# Patient Record
Sex: MALE | Race: WHITE | NOT HISPANIC OR LATINO | Employment: FULL TIME | URBAN - METROPOLITAN AREA
[De-identification: names, ages, dates, MRNs, and addresses within clinical notes are randomized per-mention and may not be internally consistent; named-entity substitution may affect disease eponyms.]

---

## 2017-06-16 ENCOUNTER — OFFICE VISIT (OUTPATIENT)
Dept: FAMILY MEDICINE CLINIC | Facility: CLINIC | Age: 37
End: 2017-06-16

## 2017-06-16 VITALS
DIASTOLIC BLOOD PRESSURE: 72 MMHG | WEIGHT: 204 LBS | RESPIRATION RATE: 16 BRPM | HEIGHT: 69 IN | HEART RATE: 68 BPM | TEMPERATURE: 97.2 F | SYSTOLIC BLOOD PRESSURE: 108 MMHG | BODY MASS INDEX: 30.21 KG/M2

## 2017-06-16 DIAGNOSIS — J01.00 ACUTE NON-RECURRENT MAXILLARY SINUSITIS: Primary | ICD-10-CM

## 2017-06-16 DIAGNOSIS — J30.2 SEASONAL ALLERGIC RHINITIS, UNSPECIFIED ALLERGIC RHINITIS TRIGGER: ICD-10-CM

## 2017-06-16 PROCEDURE — 99203 OFFICE O/P NEW LOW 30 MIN: CPT | Performed by: NURSE PRACTITIONER

## 2017-06-16 RX ORDER — FEXOFENADINE HCL AND PSEUDOEPHEDRINE HCI 180; 240 MG/1; MG/1
1 TABLET, EXTENDED RELEASE ORAL DAILY
COMMUNITY
End: 2017-06-16 | Stop reason: SDUPTHER

## 2017-06-16 RX ORDER — AMOXICILLIN AND CLAVULANATE POTASSIUM 875; 125 MG/1; MG/1
1 TABLET, FILM COATED ORAL 2 TIMES DAILY
Qty: 20 TABLET | Refills: 0 | Status: SHIPPED | OUTPATIENT
Start: 2017-06-16 | End: 2017-06-26

## 2017-06-16 RX ORDER — FLUTICASONE PROPIONATE 50 MCG
2 SPRAY, SUSPENSION (ML) NASAL DAILY
Qty: 1 EACH | Refills: 5 | Status: SHIPPED | OUTPATIENT
Start: 2017-06-16 | End: 2017-07-16

## 2017-06-16 RX ORDER — FEXOFENADINE HCL AND PSEUDOEPHEDRINE HCI 180; 240 MG/1; MG/1
1 TABLET, EXTENDED RELEASE ORAL DAILY
Qty: 30 TABLET | Refills: 5 | Status: SHIPPED | OUTPATIENT
Start: 2017-06-16 | End: 2017-06-26

## 2017-06-16 NOTE — PROGRESS NOTES
Subjective   Santana Radford is a 37 y.o. male.     History of Present Illness   NP to establish care  Sinus infection   Nasal congestion, head fullness, ear fullness, cough and ST for the past week or more, started getting better then now feeling much worse, fever and yellow thick nasal secretions  Facial pain and pressure  No wheezing or tightness in chest or productive cough  He has a hx of sinus infections one bad one usually one time a year since he started taking allergy medication Allegra D year round has decreased number of sinus infections  Other family members are sick too  No other complaints or issues today    The following portions of the patient's history were reviewed and updated as appropriate: allergies, current medications, past family history, past medical history, past social history, past surgical history and problem list.    Review of Systems   Constitutional: Positive for fever.   HENT: Positive for congestion, ear pain, postnasal drip, rhinorrhea, sinus pressure and sore throat. Negative for trouble swallowing.    Eyes: Negative.    Respiratory: Positive for cough. Negative for chest tightness, shortness of breath and wheezing.    Cardiovascular: Negative.    Gastrointestinal: Negative.    Endocrine: Negative.    Genitourinary: Negative.    Musculoskeletal: Negative.    Skin: Negative.    Allergic/Immunologic: Negative.    Neurological: Positive for headaches. Negative for dizziness and light-headedness.   Hematological: Negative.    Psychiatric/Behavioral: Negative.        Objective   Physical Exam   Constitutional: He is oriented to person, place, and time. Vital signs are normal. He appears well-developed and well-nourished. No distress.   HENT:   Head: Normocephalic.   Right Ear: External ear and ear canal normal. Tympanic membrane is retracted (clear fluid behind TM). Tympanic membrane is not erythematous.   Left Ear: External ear and ear canal normal. Tympanic membrane is bulging.  Tympanic membrane is not erythematous.   Nose: Mucosal edema and rhinorrhea present. Right sinus exhibits maxillary sinus tenderness. Right sinus exhibits no frontal sinus tenderness. Left sinus exhibits maxillary sinus tenderness. Left sinus exhibits no frontal sinus tenderness.   Mouth/Throat: Uvula is midline, oropharynx is clear and moist and mucous membranes are normal.   Eyes: Conjunctivae and lids are normal. Pupils are equal, round, and reactive to light.   Neck: Trachea normal. Neck supple. Carotid bruit is not present. No thyromegaly present.   Cardiovascular: Normal rate, regular rhythm, S1 normal, S2 normal, normal heart sounds and intact distal pulses.    Pulmonary/Chest: Effort normal and breath sounds normal.   Abdominal: Soft. Normal appearance and bowel sounds are normal.   Musculoskeletal: He exhibits no edema.   Lymphadenopathy:        Head (right side): No tonsillar, no preauricular, no posterior auricular and no occipital adenopathy present.        Head (left side): No tonsillar, no preauricular, no posterior auricular and no occipital adenopathy present.     He has no cervical adenopathy.   Neurological: He is alert and oriented to person, place, and time.   Skin: Skin is warm, dry and intact. No rash noted. No cyanosis. Nails show no clubbing.   Psychiatric: He has a normal mood and affect. His speech is normal and behavior is normal. Judgment and thought content normal. Cognition and memory are normal.   Nursing note and vitals reviewed.      Assessment/Plan ;  Santana was seen today for np / establish care and sinus congestion & drainage.    Diagnoses and all orders for this visit:    Acute non-recurrent maxillary sinusitis    Other orders  -     amoxicillin-clavulanate (AUGMENTIN) 875-125 MG per tablet; Take 1 tablet by mouth 2 (Two) Times a Day.  -     fluticasone (FLONASE) 50 MCG/ACT nasal spray; 2 sprays into each nostril Daily for 30 days. Administer 2 sprays in each nostril for each  dose.  -     fexofenadine-pseudoephedrine (ALLEGRA-D 24) 180-240 MG per 24 hr tablet; Take 1 tablet by mouth Daily.      Take meds as directed  Recommended sinus rinse  Will refill Allegra D  F/U as needed

## 2017-06-20 ENCOUNTER — TELEPHONE (OUTPATIENT)
Dept: FAMILY MEDICINE CLINIC | Facility: CLINIC | Age: 37
End: 2017-06-20

## 2017-06-20 RX ORDER — METHYLPREDNISOLONE 4 MG/1
TABLET ORAL
Qty: 21 TABLET | Refills: 0 | Status: SHIPPED | OUTPATIENT
Start: 2017-06-20 | End: 2017-06-26

## 2017-06-20 NOTE — TELEPHONE ENCOUNTER
----- Message from Cristina Marie sent at 6/20/2017  8:22 AM EDT -----  Contact: MANAS  PATIENT DOES NOT FEEL ANY BETTER, HE SAID HE WAS TOLD HE WOULD FEEL SIGNIFICANTLY BETTER BY Monday. AND HE DOES NOT FEEL ANY BETTER.     [phone]    [facility]

## 2017-06-26 ENCOUNTER — OFFICE VISIT (OUTPATIENT)
Dept: FAMILY MEDICINE CLINIC | Facility: CLINIC | Age: 37
End: 2017-06-26

## 2017-06-26 VITALS
BODY MASS INDEX: 29.65 KG/M2 | TEMPERATURE: 98.1 F | RESPIRATION RATE: 16 BRPM | SYSTOLIC BLOOD PRESSURE: 120 MMHG | WEIGHT: 200.2 LBS | DIASTOLIC BLOOD PRESSURE: 90 MMHG | HEIGHT: 69 IN | HEART RATE: 68 BPM

## 2017-06-26 DIAGNOSIS — R10.31 RIGHT LOWER QUADRANT PAIN: ICD-10-CM

## 2017-06-26 DIAGNOSIS — J01.10 ACUTE NON-RECURRENT FRONTAL SINUSITIS: Primary | ICD-10-CM

## 2017-06-26 PROCEDURE — 99214 OFFICE O/P EST MOD 30 MIN: CPT | Performed by: FAMILY MEDICINE

## 2017-06-26 RX ORDER — LEVOFLOXACIN 500 MG/1
500 TABLET, FILM COATED ORAL DAILY
Qty: 7 TABLET | Refills: 0 | Status: SHIPPED | OUTPATIENT
Start: 2017-06-26 | End: 2017-07-03

## 2017-06-26 NOTE — PATIENT INSTRUCTIONS
Go to the nearest ER or return to clinic if symptoms worsen, fever/chill develop      Sinusitis, Adult  Sinusitis is soreness and inflammation of your sinuses. Sinuses are hollow spaces in the bones around your face. They are located:  · Around your eyes.  · In the middle of your forehead.  · Behind your nose.  · In your cheekbones.  Your sinuses and nasal passages are lined with a stringy fluid (mucus). Mucus normally drains out of your sinuses. When your nasal tissues get inflamed or swollen, the mucus can get trapped or blocked so air cannot flow through your sinuses. This lets bacteria, viruses, and funguses grow, and that leads to infection.  HOME CARE  Medicines  · Take, use, or apply over-the-counter and prescription medicines only as told by your doctor. These may include nasal sprays.  · If you were prescribed an antibiotic medicine, take it as told by your doctor. Do not stop taking the antibiotic even if you start to feel better.  Hydrate and Humidify  · Drink enough water to keep your pee (urine) clear or pale yellow.  · Use a cool mist humidifier to keep the humidity level in your home above 50%.  · Breathe in steam for 10-15 minutes, 3-4 times a day or as told by your doctor. You can do this in the bathroom while a hot shower is running.  · Try not to spend time in cool or dry air.  Rest  · Rest as much as possible.    · Sleep with your head raised (elevated).  · Make sure to get enough sleep each night.  General Instructions  · Put a warm, moist washcloth on your face 3-4 times a day or as told by your doctor. This will help with discomfort.  · Wash your hands often with soap and water. If there is no soap and water, use hand .  · Do not smoke. Avoid being around people who are smoking (secondhand smoke).  · Keep all follow-up visits as told by your doctor. This is important.  GET HELP IF:  · You have a fever.  · Your symptoms get worse.  · Your symptoms do not get better within 10 days.  GET  HELP RIGHT AWAY IF:  · You have a very bad headache.  · You cannot stop throwing up (vomiting).  · You have pain or swelling around your face or eyes.  · You have trouble seeing.  · You feel confused.  · Your neck is stiff.  · You have trouble breathing.     This information is not intended to replace advice given to you by your health care provider. Make sure you discuss any questions you have with your health care provider.     Document Released: 06/05/2009 Document Revised: 04/10/2017 Document Reviewed: 10/12/2016  [employer] Interactive Patient Education ©2017 [employer]

## 2017-06-26 NOTE — PROGRESS NOTES
Subjective   Santana Radford is a 37 y.o. male.     Sinusitis   This is a new problem. The current episode started 1 to 4 weeks ago. The problem is unchanged. The maximum temperature recorded prior to his arrival was 100.4 - 100.9 F. The fever has been present for less than 1 day. Associated symptoms include congestion, coughing, headaches and sinus pressure. Pertinent negatives include no shortness of breath or sore throat. Treatments tried: Augmentin, Medrol, Allegra-D, Flonase. The treatment provided mild relief.      RiLQ pain x 1 year  Symptoms are sporadic  Made worse by driving for long periods or lifting/pulling. States that it is uncomfortable at the moment because he was in a car all weekend.   Also, pulled weeds out of yard and that made symptoms worse.   Denies any bulges in abdomen  Eating doesn't trigger pain  BM are normal, no blood present     The following portions of the patient's history were reviewed and updated as appropriate: allergies, current medications, past family history, past medical history, past social history, past surgical history and problem list.    Review of Systems   Constitutional: Positive for fever.   HENT: Positive for congestion, postnasal drip, rhinorrhea and sinus pressure. Negative for sore throat.    Respiratory: Positive for cough. Negative for shortness of breath.    Cardiovascular: Negative for chest pain and palpitations.   Gastrointestinal: Positive for abdominal pain. Negative for abdominal distention, constipation, diarrhea, nausea and vomiting.   Genitourinary: Negative for dysuria and flank pain.   Musculoskeletal: Negative for back pain.   Neurological: Positive for headaches. Negative for dizziness.       Objective   Physical Exam   Constitutional: He is oriented to person, place, and time. He appears well-developed and well-nourished.   HENT:   Head: Normocephalic and atraumatic.   Right Ear: Hearing, external ear and ear canal normal. A middle ear effusion  (clear with air bubble) is present.   Left Ear: Hearing, tympanic membrane, external ear and ear canal normal.   Nose: Right sinus exhibits frontal sinus tenderness. Left sinus exhibits frontal sinus tenderness.   Mouth/Throat: Uvula is midline and mucous membranes are normal. Oropharyngeal exudate present.   Eyes: Conjunctivae and EOM are normal.   Neck: Normal range of motion. Neck supple.   Cardiovascular: Normal rate, regular rhythm and normal heart sounds.    Pulmonary/Chest: Effort normal and breath sounds normal.   Abdominal: Soft. Bowel sounds are normal. He exhibits no distension. There is tenderness in the right upper quadrant and right lower quadrant. There is no rebound and no guarding.   Musculoskeletal: He exhibits no edema.   Lymphadenopathy:     He has no cervical adenopathy.   Neurological: He is alert and oriented to person, place, and time. No cranial nerve deficit.   Skin: Skin is warm and dry.   Psychiatric: He has a normal mood and affect. His behavior is normal.   Nursing note and vitals reviewed.      Assessment/Plan   Santana was seen today for sinus congestion & drainage.    Diagnoses and all orders for this visit:    Acute non-recurrent frontal sinusitis  -     Chlorcyclizine-Pseudoephed 25-60 MG tablet; Take 1 tab po every 8 hours as needed for congestion  -     levoFLOXacin (LEVAQUIN) 500 MG tablet; Take 1 tablet by mouth Daily for 7 days.    Right lower quadrant pain  -     CT Abdomen Pelvis With Contrast    Sinusitis treated with Stahist and levaquin. Advised him to use saline nasal rinse to improve sinus congestion  CT of abdomen/pelvis ordered to evaluate chronic abdominal pain.

## 2017-06-28 ENCOUNTER — HOSPITAL ENCOUNTER (OUTPATIENT)
Dept: CT IMAGING | Facility: HOSPITAL | Age: 37
Discharge: HOME | End: 2017-06-28
Attending: FAMILY MEDICINE | Admitting: FAMILY MEDICINE

## 2017-06-28 PROCEDURE — 74177 CT ABD & PELVIS W/CONTRAST: CPT

## 2017-06-28 PROCEDURE — 0 IOPAMIDOL 61 % SOLUTION: Performed by: FAMILY MEDICINE

## 2017-06-28 RX ADMIN — IOPAMIDOL 70 ML: 612 INJECTION, SOLUTION INTRAVENOUS at 11:00

## 2017-08-24 ENCOUNTER — OFFICE VISIT (OUTPATIENT)
Dept: FAMILY MEDICINE CLINIC | Facility: CLINIC | Age: 37
End: 2017-08-24

## 2017-08-24 VITALS
DIASTOLIC BLOOD PRESSURE: 82 MMHG | SYSTOLIC BLOOD PRESSURE: 122 MMHG | HEART RATE: 64 BPM | TEMPERATURE: 97.1 F | WEIGHT: 200.6 LBS | BODY MASS INDEX: 29.71 KG/M2 | RESPIRATION RATE: 20 BRPM | HEIGHT: 69 IN

## 2017-08-24 DIAGNOSIS — Z00.00 PERIODIC HEALTH ASSESSMENT, GENERAL SCREENING, ADULT: Primary | ICD-10-CM

## 2017-08-24 PROCEDURE — 99395 PREV VISIT EST AGE 18-39: CPT | Performed by: NURSE PRACTITIONER

## 2017-08-24 RX ORDER — MULTIVIT WITH MINERALS/LUTEIN
1000 TABLET ORAL DAILY
COMMUNITY

## 2017-08-24 NOTE — PROGRESS NOTES
"Subjective   Santana Radford is a 37 y.o. male and is here for a comprehensive physical exam. The patient reports no problems.    Do you take any herbs or supplements that were not prescribed by a doctor? yes Vit C, MVI  Are you taking calcium supplements? no  Are you taking aspirin daily? no     History:  Patient receives prostate care outside our clinic  Date last prostate exam: NA  Date last PSA: NA    No recent eye exam, no visual disturbances, no family hx of glaucoma  Up to date on dental exams  Eats healthy diet.  Regular exercise just yard work   with 3 children. Doing well with family  Work going well.   Sleeping well  Regular BMs  Voiding well with frequency or urgency    The following portions of the patient's history were reviewed and updated as appropriate: allergies, current medications, past family history, past medical history, past social history, past surgical history and problem list.    Review of Systems  Do you have pain that bothers you in your daily life? no  A comprehensive review of systems was negative. in all 12 ROS negative    Objective   /82  Pulse 64  Temp 97.1 °F (36.2 °C)  Resp 20  Ht 69\" (175.3 cm)  Wt 200 lb 9.6 oz (91 kg)  BMI 29.62 kg/m2  General appearance: alert, appears stated age and cooperative  Head: Normocephalic, without obvious abnormality, atraumatic  Eyes: conjunctivae/corneas clear. PERRL, EOM's intact. Fundi benign.  Ears: normal TM's and external ear canals both ears  Nose: Nares normal. Septum midline. Mucosa normal. No drainage or sinus tenderness.  Throat: lips, mucosa, and tongue normal; teeth and gums normal  Neck: no adenopathy, no carotid bruit, no JVD, supple, symmetrical, trachea midline and thyroid not enlarged, symmetric, no tenderness/mass/nodules  Lungs: clear to auscultation bilaterally  Heart: regular rate and rhythm, S1, S2 normal, no murmur, click, rub or gallop  Abdomen: soft, non-tender; bowel sounds normal; no masses,  no " organomegaly  Extremities: extremities normal, atraumatic, no cyanosis or edema  Pulses: 2+ and symmetric  Skin: Skin color, texture, turgor normal. No rashes or lesions  Lymph nodes: Cervical, supraclavicular, and axillary nodes normal.  Neurologic: Grossly normal     Assessment/Plan   Healthy male exam.     1. Screening labs lipids, CMP today. Will complete wellness forms for pt pending results and will fax to appropriate place.  2. Patient Counseling:  --Nutrition: Stressed importance of moderation in sodium/caffeine intake, saturated fat and cholesterol, caloric balance, sufficient intake of fresh fruits, vegetables, fiber, calcium, iron, and 1 mg of folate supplement per day (for females capable of pregnancy).  --Discussed the importance of regular exercise, stress reduction, regular sleep at least 8 hours per night, healthy eating, regular dental and eye exams. Will start prostate screening at 39, colonoscopy age 49. No texting and driving.   --Exercise: Stressed the importance of regular exercise.   --Substance Abuse: Discussed cessation/primary prevention of tobacco, alcohol, or other drug use; driving or other dangerous activities under the influence; availability of treatment for abuse.   --Injury prevention: Discussed safety belts, safety helmets, smoke detector, smoking near bedding or upholstery.   --Dental health: Discussed importance of regular tooth brushing, flossing, and dental visits.  --Immunizations reviewed. Pt is up to date  --Discussed benefits of screening colonoscopy.  --After hours service discussed with patient    3. Discussed the patient's BMI with him.  The BMI is in the acceptable range  4. Follow up in one year

## 2017-08-25 DIAGNOSIS — R79.89 ELEVATED LFTS: Primary | ICD-10-CM

## 2017-08-25 LAB
ALBUMIN SERPL-MCNC: 4.4 G/DL (ref 3.2–4.8)
ALBUMIN/GLOB SERPL: 1.9 G/DL (ref 1.5–2.5)
ALP SERPL-CCNC: 96 U/L (ref 25–100)
ALT SERPL-CCNC: 68 U/L (ref 7–40)
AST SERPL-CCNC: 37 U/L (ref 0–33)
BILIRUB SERPL-MCNC: 1 MG/DL (ref 0.3–1.2)
BUN SERPL-MCNC: 14 MG/DL (ref 9–23)
BUN/CREAT SERPL: 15.6 (ref 7–25)
CALCIUM SERPL-MCNC: 9 MG/DL (ref 8.7–10.4)
CHLORIDE SERPL-SCNC: 105 MMOL/L (ref 99–109)
CHOLEST SERPL-MCNC: 209 MG/DL (ref 0–200)
CO2 SERPL-SCNC: 28 MMOL/L (ref 20–31)
CREAT SERPL-MCNC: 0.9 MG/DL (ref 0.6–1.3)
GLOBULIN SER CALC-MCNC: 2.3 GM/DL
GLUCOSE SERPL-MCNC: 92 MG/DL (ref 70–100)
HDLC SERPL-MCNC: 51 MG/DL (ref 40–60)
LDLC SERPL CALC-MCNC: 138 MG/DL (ref 0–100)
POTASSIUM SERPL-SCNC: 4.4 MMOL/L (ref 3.5–5.5)
PROT SERPL-MCNC: 6.7 G/DL (ref 5.7–8.2)
SODIUM SERPL-SCNC: 138 MMOL/L (ref 132–146)
TRIGL SERPL-MCNC: 98 MG/DL (ref 0–150)
VLDLC SERPL CALC-MCNC: 19.6 MG/DL

## 2017-08-30 ENCOUNTER — RESULTS ENCOUNTER (OUTPATIENT)
Dept: FAMILY MEDICINE CLINIC | Facility: CLINIC | Age: 37
End: 2017-08-30

## 2017-08-30 DIAGNOSIS — R79.89 ELEVATED LFTS: ICD-10-CM

## 2018-05-03 ENCOUNTER — TELEPHONE (OUTPATIENT)
Dept: FAMILY MEDICINE CLINIC | Facility: CLINIC | Age: 38
End: 2018-05-03

## 2018-05-03 DIAGNOSIS — J01.10 ACUTE NON-RECURRENT FRONTAL SINUSITIS: ICD-10-CM

## 2018-05-03 NOTE — TELEPHONE ENCOUNTER
----- Message from Sarah Hart sent at 5/3/2018  8:29 AM EDT -----  Contact: DR [facility]ER MED REFILL  MED REFILL ON Chlorcyclizine-Pseudoephed 25-60 MG tablet TO [facility] IN [place]  [phone]

## 2018-05-07 ENCOUNTER — TELEPHONE (OUTPATIENT)
Dept: FAMILY MEDICINE CLINIC | Facility: CLINIC | Age: 38
End: 2018-05-07

## 2018-05-07 NOTE — TELEPHONE ENCOUNTER
----- Message from Cristina Marie sent at 5/7/2018  9:30 AM EDT -----  PATIENT HAD CALLED FOR A REFILL FOR ALLEGRO D AND STATIS WAS CALLED IN .     HE WOULD LIKE TO GET THE ALLEGRA BECAUSE ITS 24 HR    ALLEGRA -D 24  180-240 MG PER 24 HOUR TABLET    [facility] IN [place]

## 2018-05-08 RX ORDER — FEXOFENADINE HCL AND PSEUDOEPHEDRINE HCI 180; 240 MG/1; MG/1
1 TABLET, EXTENDED RELEASE ORAL DAILY
Qty: 30 TABLET | Refills: 5 | Status: SHIPPED | OUTPATIENT
Start: 2018-05-08 | End: 2019-01-24 | Stop reason: SDUPTHER

## 2019-01-24 RX ORDER — FEXOFENADINE HYDROCHLORIDE AND PSEUDOEPHEDRINE HYDROCHLORIDE 180; 240 MG/1; MG/1
TABLET, FILM COATED, EXTENDED RELEASE ORAL
Qty: 30 TABLET | Refills: 1 | Status: SHIPPED | OUTPATIENT
Start: 2019-01-24 | End: 2019-03-08 | Stop reason: SDUPTHER

## 2019-03-08 ENCOUNTER — OFFICE VISIT (OUTPATIENT)
Dept: FAMILY MEDICINE CLINIC | Facility: CLINIC | Age: 39
End: 2019-03-08

## 2019-03-08 VITALS
SYSTOLIC BLOOD PRESSURE: 118 MMHG | WEIGHT: 201 LBS | HEART RATE: 75 BPM | DIASTOLIC BLOOD PRESSURE: 72 MMHG | RESPIRATION RATE: 18 BRPM | BODY MASS INDEX: 29.68 KG/M2 | OXYGEN SATURATION: 98 % | TEMPERATURE: 97.6 F

## 2019-03-08 DIAGNOSIS — J01.90 ACUTE NON-RECURRENT SINUSITIS, UNSPECIFIED LOCATION: Primary | ICD-10-CM

## 2019-03-08 PROCEDURE — 99213 OFFICE O/P EST LOW 20 MIN: CPT | Performed by: FAMILY MEDICINE

## 2019-03-08 RX ORDER — AMOXICILLIN AND CLAVULANATE POTASSIUM 875; 125 MG/1; MG/1
1 TABLET, FILM COATED ORAL 2 TIMES DAILY
Qty: 20 TABLET | Refills: 0 | Status: SHIPPED | OUTPATIENT
Start: 2019-03-08 | End: 2019-03-18

## 2019-03-08 RX ORDER — FEXOFENADINE HCL AND PSEUDOEPHEDRINE HCI 180; 240 MG/1; MG/1
1 TABLET, EXTENDED RELEASE ORAL DAILY
Qty: 30 TABLET | Refills: 1 | Status: SHIPPED | OUTPATIENT
Start: 2019-03-08 | End: 2019-05-15 | Stop reason: SDUPTHER

## 2019-03-08 RX ORDER — METHYLPREDNISOLONE 4 MG/1
TABLET ORAL
Qty: 21 EACH | Refills: 0 | Status: SHIPPED | OUTPATIENT
Start: 2019-03-08 | End: 2019-03-18

## 2019-03-08 NOTE — PATIENT INSTRUCTIONS
Go to the nearest ER or return to clinic if symptoms worsen, fever/chill develop    Sinusitis, Adult  Sinusitis is soreness and inflammation of your sinuses. Sinuses are hollow spaces in the bones around your face. They are located:  · Around your eyes.  · In the middle of your forehead.  · Behind your nose.  · In your cheekbones.    Your sinuses and nasal passages are lined with a stringy fluid (mucus). Mucus normally drains out of your sinuses. When your nasal tissues get inflamed or swollen, the mucus can get trapped or blocked so air cannot flow through your sinuses. This lets bacteria, viruses, and funguses grow, and that leads to infection.  Follow these instructions at home:  Medicines  · Take, use, or apply over-the-counter and prescription medicines only as told by your doctor. These may include nasal sprays.  · If you were prescribed an antibiotic medicine, take it as told by your doctor. Do not stop taking the antibiotic even if you start to feel better.  Hydrate and Humidify  · Drink enough water to keep your pee (urine) clear or pale yellow.  · Use a cool mist humidifier to keep the humidity level in your home above 50%.  · Breathe in steam for 10-15 minutes, 3-4 times a day or as told by your doctor. You can do this in the bathroom while a hot shower is running.  · Try not to spend time in cool or dry air.  Rest  · Rest as much as possible.  · Sleep with your head raised (elevated).  · Make sure to get enough sleep each night.  General instructions  · Put a warm, moist washcloth on your face 3-4 times a day or as told by your doctor. This will help with discomfort.  · Wash your hands often with soap and water. If there is no soap and water, use hand .  · Do not smoke. Avoid being around people who are smoking (secondhand smoke).  · Keep all follow-up visits as told by your doctor. This is important.  Contact a doctor if:  · You have a fever.  · Your symptoms get worse.  · Your symptoms do not  get better within 10 days.  Get help right away if:  · You have a very bad headache.  · You cannot stop throwing up (vomiting).  · You have pain or swelling around your face or eyes.  · You have trouble seeing.  · You feel confused.  · Your neck is stiff.  · You have trouble breathing.  This information is not intended to replace advice given to you by your health care provider. Make sure you discuss any questions you have with your health care provider.  Document Released: 06/05/2009 Document Revised: 08/13/2017 Document Reviewed: 10/12/2016  [employer] Interactive Patient Education © 2018 [employer]

## 2019-03-08 NOTE — PROGRESS NOTES
Subjective   Santana Radford is a 39 y.o. male.   Chief Complaint   Patient presents with   • Sinusitis     1Xweek    • Cough   • Nasal Congestion     Sinusitis   This is a new problem. The current episode started 1 to 4 weeks ago. The problem is unchanged. There has been no fever. Associated symptoms include congestion, coughing (non-productive), ear pain, headaches and sinus pressure. Pertinent negatives include no chills, shortness of breath or sore throat. Past treatments include oral decongestants (Allegra-D). The treatment provided no relief.        The following portions of the patient's history were reviewed and updated as appropriate: allergies, current medications, past family history, past medical history, past social history, past surgical history and problem list.    Review of Systems   Constitutional: Negative for chills and fever.   HENT: Positive for congestion, ear pain, postnasal drip and sinus pressure. Negative for sore throat.    Respiratory: Positive for cough (non-productive). Negative for shortness of breath and wheezing.    Cardiovascular: Negative.    Gastrointestinal: Negative for nausea and vomiting.       Objective   Physical Exam   Constitutional: He appears well-developed and well-nourished. No distress.   HENT:   Head: Normocephalic.   Right Ear: Hearing, external ear and ear canal normal. Tympanic membrane is not injected, not erythematous and not retracted. A middle ear effusion (clear fluid) is present.   Left Ear: Hearing, tympanic membrane, external ear and ear canal normal. Tympanic membrane is not injected, not erythematous and not retracted.   Nose: Congestion present.   Mouth/Throat: Uvula is midline and mucous membranes are normal. Oropharyngeal exudate present.   Eyes: Conjunctivae are normal.   Cardiovascular: Normal rate, regular rhythm and normal heart sounds.   Pulmonary/Chest: Effort normal and breath sounds normal. He has no wheezes.   Musculoskeletal: He exhibits no  edema.   Lymphadenopathy:     He has no cervical adenopathy.   Neurological: He is alert.   Skin: Skin is warm.   Nursing note and vitals reviewed.        Assessment/Plan   Santana was seen today for sinusitis, cough and nasal congestion.    Diagnoses and all orders for this visit:    Acute non-recurrent sinusitis, unspecified location  -     amoxicillin-clavulanate (AUGMENTIN) 875-125 MG per tablet; Take 1 tablet by mouth 2 (Two) Times a Day for 10 days.  -     methylPREDNISolone (MEDROL, ZUNILDA,) 4 MG tablet; Take as directed on package instructions.  -     fexofenadine-pseudoephedrine (ALLEGRA-D ALLERGY & CONGESTION) 180-240 MG per 24 hr tablet; Take 1 tablet by mouth Daily.      Augmentin, steroid taper, and allegra-D to improve sinusitis.   Follow up if no improvement.

## 2019-03-18 ENCOUNTER — TELEPHONE (OUTPATIENT)
Dept: FAMILY MEDICINE CLINIC | Facility: CLINIC | Age: 39
End: 2019-03-18

## 2019-03-18 RX ORDER — LEVOFLOXACIN 500 MG/1
500 TABLET, FILM COATED ORAL DAILY
Qty: 7 TABLET | Refills: 0 | Status: SHIPPED | OUTPATIENT
Start: 2019-03-18 | End: 2019-05-20

## 2019-03-18 RX ORDER — GUAIFENESIN 600 MG/1
600 TABLET, EXTENDED RELEASE ORAL 2 TIMES DAILY
Qty: 14 TABLET | Refills: 0 | Status: SHIPPED | OUTPATIENT
Start: 2019-03-18 | End: 2019-03-25

## 2019-03-18 NOTE — TELEPHONE ENCOUNTER
He was prescribed Augmentin (which has amoxicillin as one of the components) during his visit with me on 3/8/19.     Levaquin and mucinex sent to pharmacy for additional treatment.

## 2019-05-15 ENCOUNTER — TELEPHONE (OUTPATIENT)
Dept: FAMILY MEDICINE CLINIC | Facility: CLINIC | Age: 39
End: 2019-05-15

## 2019-05-15 DIAGNOSIS — J01.90 ACUTE NON-RECURRENT SINUSITIS, UNSPECIFIED LOCATION: ICD-10-CM

## 2019-05-15 RX ORDER — FEXOFENADINE HCL AND PSEUDOEPHEDRINE HCI 180; 240 MG/1; MG/1
1 TABLET, EXTENDED RELEASE ORAL DAILY
Qty: 30 TABLET | Refills: 1 | Status: SHIPPED | OUTPATIENT
Start: 2019-05-15 | End: 2019-08-05 | Stop reason: SDUPTHER

## 2019-05-15 NOTE — TELEPHONE ENCOUNTER
----- Message from Rick Bradford sent at 5/15/2019  9:13 AM EDT -----  Contact: PT; MIK  PATIENT IS WANTING SOME GEETA CASTILLO CALLED IN TO [facility].    PT: [phone]

## 2019-05-20 ENCOUNTER — OFFICE VISIT (OUTPATIENT)
Dept: FAMILY MEDICINE CLINIC | Facility: CLINIC | Age: 39
End: 2019-05-20

## 2019-05-20 VITALS
HEART RATE: 82 BPM | RESPIRATION RATE: 16 BRPM | BODY MASS INDEX: 29.33 KG/M2 | DIASTOLIC BLOOD PRESSURE: 76 MMHG | OXYGEN SATURATION: 99 % | TEMPERATURE: 97.8 F | HEIGHT: 69 IN | SYSTOLIC BLOOD PRESSURE: 138 MMHG | WEIGHT: 198 LBS

## 2019-05-20 DIAGNOSIS — Z13.220 SCREENING FOR HYPERLIPIDEMIA: ICD-10-CM

## 2019-05-20 DIAGNOSIS — Z23 NEED FOR TDAP VACCINATION: ICD-10-CM

## 2019-05-20 DIAGNOSIS — E66.3 OVERWEIGHT (BMI 25.0-29.9): Primary | ICD-10-CM

## 2019-05-20 DIAGNOSIS — Z00.00 ANNUAL PHYSICAL EXAM: ICD-10-CM

## 2019-05-20 PROCEDURE — 90471 IMMUNIZATION ADMIN: CPT | Performed by: FAMILY MEDICINE

## 2019-05-20 PROCEDURE — 99396 PREV VISIT EST AGE 40-64: CPT | Performed by: FAMILY MEDICINE

## 2019-05-20 PROCEDURE — 90715 TDAP VACCINE 7 YRS/> IM: CPT | Performed by: FAMILY MEDICINE

## 2019-05-20 NOTE — PATIENT INSTRUCTIONS
Serving Sizes  A serving size is a measured amount of food or drink, such as one slice of bread, that has an associated nutrient content. Knowing the serving size of a food or drink can help you determine how much of that food you should consume.  What is the size of one serving?  The size of one healthy serving depends on the food or drink. To determine a serving size, read the food label. If the food or drink does not have a food label, try to find serving size information online. Or, use the following to estimate the size of one adult serving:  Grain  1 slice bread. ½ bagel. ½ cup pasta.  Vegetable  ½ cup cooked or canned vegetables. 1 cup raw, leafy greens.  Fruit  ½ cup canned fruit. 1 medium fruit. ¼ cup dried fruit.  Meat and Other Protein Sources  1 oz meat, poultry, or fish. ¼ cup cooked beans. 1 egg. ¼ cup nuts or seeds. 1 Tbsp nut butter. ¼ cup tofu or tempeh. 2 Tbsp hummus.  Dairy  An individual container of yogurt (6-8 oz). 1 piece of cheese the size of your thumb (1 oz). 1 cup (8 oz) milk or milk alternative.  Fat  A piece the size of one dice. 1 tsp soft margarine. 1 Tbsp mayonnaise. 1 tsp vegetable oil. 1 Tbsp regular salad dressing. 2 Tbsp low-fat salad dressing.  How many servings should I eat from each food group each day?  The following are the suggested number of servings to try and have every day from each food group. You can also look at your eating throughout the week and aim for meeting these requirements on most days for overall healthy eating.  Grain  6-8 servings. Try to have half of your grains from whole grains, such as whole wheat bread, corn tortillas, oatmeal, brown rice, whole wheat pasta, and bulgur.  Vegetable  At least 2½-3 servings.  Fruit  2 servings.  Meat and Other Protein Foods  5-6 servings. Aim to have lean proteins, such as chicken, turkey, fish, beans, or tofu.  Dairy  3 servings. Choose low-fat or nonfat if you are trying to control your weight.  Fat  2-3  servings.  Is a serving the same thing as a portion?  No. A portion is the actual amount you eat, which may be more than one serving. Knowing the specific serving size of a food and the nutritional information that goes with it can help you make a healthy decision on what size portion to eat.  What are some tips to help me learn healthy serving sizes?  · Check food labels for serving sizes. Many foods that come as a single portion actually contain multiple servings.  · Determine the serving size of foods you commonly eat and figure out how large a portion you usually eat.  · Measure the number of servings that can be held by the bowls, glasses, cups, and plates you typically use. For example, pour your breakfast cereal into your regular bowl and then pour it into a measuring cup.  · For 1-2 days, measure the serving sizes of all the foods you eat.  · Practice estimating serving sizes and determining how big your portions should be.  This information is not intended to replace advice given to you by your health care provider. Make sure you discuss any questions you have with your health care provider.  Document Released: 09/15/2004 Document Revised: 08/12/2017 Document Reviewed: 03/17/2015  [employer] Interactive Patient Education © 2018 [employer]    Preventive Care 40-64 Years, Male  Preventive care refers to lifestyle choices and visits with your health care provider that can promote health and wellness.  What does preventive care include?  · A yearly physical exam. This is also called an annual well check.  · Dental exams once or twice a year.  · Routine eye exams. Ask your health care provider how often you should have your eyes checked.  · Personal lifestyle choices, including:  ? Daily care of your teeth and gums.  ? Regular physical activity.  ? Eating a healthy diet.  ? Avoiding tobacco and drug use.  ? Limiting alcohol use.  ? Practicing safe sex.  ? Taking low-dose aspirin every day starting at age  50.  What happens during an annual well check?  The services and screenings done by your health care provider during your annual well check will depend on your age, overall health, lifestyle risk factors, and family history of disease.  Counseling  Your health care provider may ask you questions about your:  · Alcohol use.  · Tobacco use.  · Drug use.  · Emotional well-being.  · Home and relationship well-being.  · Sexual activity.  · Eating habits.  · Work and work environment.    Screening  You may have the following tests or measurements:  · Height, weight, and BMI.  · Blood pressure.  · Lipid and cholesterol levels. These may be checked every 5 years, or more frequently if you are over 50 years old.  · Skin check.  · Lung cancer screening. You may have this screening every year starting at age 55 if you have a 30-pack-year history of smoking and currently smoke or have quit within the past 15 years.  · Fecal occult blood test (FOBT) of the stool. You may have this test every year starting at age 50.  · Flexible sigmoidoscopy or colonoscopy. You may have a sigmoidoscopy every 5 years or a colonoscopy every 10 years starting at age 50.  · Prostate cancer screening. Recommendations will vary depending on your family history and other risks.  · Hepatitis C blood test.  · Hepatitis B blood test.  · Sexually transmitted disease (STD) testing.  · Diabetes screening. This is done by checking your blood sugar (glucose) after you have not eaten for a while (fasting). You may have this done every 1-3 years.    Discuss your test results, treatment options, and if necessary, the need for more tests with your health care provider.  Vaccines  Your health care provider may recommend certain vaccines, such as:  · Influenza vaccine. This is recommended every year.  · Tetanus, diphtheria, and acellular pertussis (Tdap, Td) vaccine. You may need a Td booster every 10 years.  · Varicella vaccine. You may need this if you have not  been vaccinated.  · Zoster vaccine. You may need this after age 60.  · Measles, mumps, and rubella (MMR) vaccine. You may need at least one dose of MMR if you were born in 1957 or later. You may also need a second dose.  · Pneumococcal 13-valent conjugate (PCV13) vaccine. You may need this if you have certain conditions and have not been vaccinated.  · Pneumococcal polysaccharide (PPSV23) vaccine. You may need one or two doses if you smoke cigarettes or if you have certain conditions.  · Meningococcal vaccine. You may need this if you have certain conditions.  · Hepatitis A vaccine. You may need this if you have certain conditions or if you travel or work in places where you may be exposed to hepatitis A.  · Hepatitis B vaccine. You may need this if you have certain conditions or if you travel or work in places where you may be exposed to hepatitis B.  · Haemophilus influenzae type b (Hib) vaccine. You may need this if you have certain risk factors.    Talk to your health care provider about which screenings and vaccines you need and how often you need them.  This information is not intended to replace advice given to you by your health care provider. Make sure you discuss any questions you have with your health care provider.  Document Released: 01/13/2017 Document Revised: 09/06/2017 Document Reviewed: 10/18/2016  [employer] Interactive Patient Education © 2019 [employer]

## 2019-05-20 NOTE — PROGRESS NOTES
Subjective   Santana Radford is a 39 y.o. male.   Chief Complaint   Patient presents with   • Annual Exam     History of Present Illness   Here for annual physical  Fasting for labs today   Dental exam: up to date, scheduled 5/22/19  Vision exam: not up to date  Grandfather has history of colon cancer, but no immediate family member.   Exercise: He works in [employer], active job. No specific diet, well balanced.     The following portions of the patient's history were reviewed and updated as appropriate: allergies, current medications, past family history, past medical history, past social history, past surgical history and problem list.    Review of Systems   Constitutional: Negative for chills, fatigue and fever.   HENT: Negative.    Eyes: Negative.    Respiratory: Negative for cough, chest tightness and shortness of breath.    Cardiovascular: Negative for chest pain and palpitations.   Skin: Negative for rash.   Neurological: Negative for light-headedness, headache and confusion.   Hematological: Negative for adenopathy. Does not bruise/bleed easily.   Psychiatric/Behavioral: Negative.        Objective   Physical Exam   Constitutional: He is oriented to person, place, and time. He appears well-developed and well-nourished. No distress.   HENT:   Head: Normocephalic.   Right Ear: Hearing, tympanic membrane, external ear and ear canal normal.   Left Ear: Hearing, tympanic membrane, external ear and ear canal normal.   Nose: Nose normal.   Mouth/Throat: Uvula is midline, oropharynx is clear and moist and mucous membranes are normal.   Eyes: Conjunctivae are normal.   Neck: Neck supple.   Cardiovascular: Normal rate, regular rhythm and normal heart sounds.   No murmur heard.  Pulmonary/Chest: Effort normal and breath sounds normal. He has no wheezes.   Abdominal: Soft. There is no tenderness.   Musculoskeletal: He exhibits no edema or deformity.   Lymphadenopathy:     He has no cervical adenopathy.   Neurological:  He is alert and oriented to person, place, and time.   Skin: Skin is warm and dry.   Psychiatric: He has a normal mood and affect. His behavior is normal. Judgment and thought content normal.   Nursing note and vitals reviewed.        Assessment/Plan   Santana was seen today for annual exam.    Diagnoses and all orders for this visit:    Overweight (BMI 25.0-29.9)  -     CBC & Differential  -     Comprehensive Metabolic Panel  -     Lipid Panel    Annual physical exam  -     CBC & Differential  -     Comprehensive Metabolic Panel  -     Lipid Panel    Screening for hyperlipidemia  -     CBC & Differential  -     Comprehensive Metabolic Panel  -     Lipid Panel    Need for Tdap vaccination  -     Tdap Vaccine Greater Than or Equal To 6yo IM    BMI 29.0-29.9,adult    Other orders  -     Cancel: Tdap Vaccine Greater Than or Equal To 6yo IM      Health advice: healthy food choices with fresh fruits and vegetables, maintain sleep pattern at least 8 hours, avoid texting and distracted driving practices; wear safety belt, engage in regular exercise, maintain healthy weight, use safe sex practices, avoid alcohol and illicit drugs. Maintain immunizations that are up to date. Maintain health maintenance: PSA, colonoscopy, etc.  Follow up with PCP if struggling with depression or anxiety. Keep regular dental and eye exams. Brush and floss teeth daily.   F/U annually and prn.

## 2019-05-21 LAB
ALBUMIN SERPL-MCNC: 4.1 G/DL (ref 3.5–5.2)
ALBUMIN/GLOB SERPL: 1.6 G/DL
ALP SERPL-CCNC: 78 U/L (ref 39–117)
ALT SERPL-CCNC: 29 U/L (ref 1–41)
AST SERPL-CCNC: 19 U/L (ref 1–40)
BASOPHILS # BLD AUTO: 0.07 10*3/MM3 (ref 0–0.2)
BASOPHILS NFR BLD AUTO: 1.3 % (ref 0–1.5)
BILIRUB SERPL-MCNC: 1.2 MG/DL (ref 0.2–1.2)
BUN SERPL-MCNC: 13 MG/DL (ref 6–20)
BUN/CREAT SERPL: 12.7 (ref 7–25)
CALCIUM SERPL-MCNC: 9.6 MG/DL (ref 8.6–10.5)
CHLORIDE SERPL-SCNC: 101 MMOL/L (ref 98–107)
CHOLEST SERPL-MCNC: 199 MG/DL (ref 0–200)
CO2 SERPL-SCNC: 27.5 MMOL/L (ref 22–29)
CREAT SERPL-MCNC: 1.02 MG/DL (ref 0.76–1.27)
EOSINOPHIL # BLD AUTO: 0.09 10*3/MM3 (ref 0–0.4)
EOSINOPHIL NFR BLD AUTO: 1.7 % (ref 0.3–6.2)
ERYTHROCYTE [DISTWIDTH] IN BLOOD BY AUTOMATED COUNT: 12.9 % (ref 12.3–15.4)
GLOBULIN SER CALC-MCNC: 2.6 GM/DL
GLUCOSE SERPL-MCNC: 93 MG/DL (ref 65–99)
HCT VFR BLD AUTO: 45.8 % (ref 37.5–51)
HDLC SERPL-MCNC: 55 MG/DL (ref 40–60)
HGB BLD-MCNC: 15.2 G/DL (ref 13–17.7)
IMM GRANULOCYTES # BLD AUTO: 0.01 10*3/MM3 (ref 0–0.05)
IMM GRANULOCYTES NFR BLD AUTO: 0.2 % (ref 0–0.5)
LDLC SERPL CALC-MCNC: 124 MG/DL (ref 0–100)
LYMPHOCYTES # BLD AUTO: 1.85 10*3/MM3 (ref 0.7–3.1)
LYMPHOCYTES NFR BLD AUTO: 34 % (ref 19.6–45.3)
MCH RBC QN AUTO: 28.5 PG (ref 26.6–33)
MCHC RBC AUTO-ENTMCNC: 33.2 G/DL (ref 31.5–35.7)
MCV RBC AUTO: 85.8 FL (ref 79–97)
MONOCYTES # BLD AUTO: 0.5 10*3/MM3 (ref 0.1–0.9)
MONOCYTES NFR BLD AUTO: 9.2 % (ref 5–12)
NEUTROPHILS # BLD AUTO: 2.92 10*3/MM3 (ref 1.7–7)
NEUTROPHILS NFR BLD AUTO: 53.6 % (ref 42.7–76)
NRBC BLD AUTO-RTO: 0 /100 WBC (ref 0–0.2)
PLATELET # BLD AUTO: 189 10*3/MM3 (ref 140–450)
POTASSIUM SERPL-SCNC: 4.4 MMOL/L (ref 3.5–5.2)
PROT SERPL-MCNC: 6.7 G/DL (ref 6–8.5)
RBC # BLD AUTO: 5.34 10*6/MM3 (ref 4.14–5.8)
SODIUM SERPL-SCNC: 141 MMOL/L (ref 136–145)
TRIGL SERPL-MCNC: 99 MG/DL (ref 0–150)
VLDLC SERPL CALC-MCNC: 19.8 MG/DL
WBC # BLD AUTO: 5.44 10*3/MM3 (ref 3.4–10.8)

## 2019-08-05 ENCOUNTER — TELEPHONE (OUTPATIENT)
Dept: FAMILY MEDICINE CLINIC | Facility: CLINIC | Age: 39
End: 2019-08-05

## 2019-08-05 DIAGNOSIS — J01.90 ACUTE NON-RECURRENT SINUSITIS, UNSPECIFIED LOCATION: ICD-10-CM

## 2019-08-05 RX ORDER — FEXOFENADINE HCL AND PSEUDOEPHEDRINE HCI 180; 240 MG/1; MG/1
1 TABLET, EXTENDED RELEASE ORAL DAILY PRN
Qty: 30 TABLET | Refills: 1 | Status: SHIPPED | OUTPATIENT
Start: 2019-08-05 | End: 2020-03-04 | Stop reason: SDUPTHER

## 2019-08-05 NOTE — TELEPHONE ENCOUNTER
----- Message from Nu Bardales sent at 8/5/2019  2:13 PM EDT -----  Contact: bao; med request   Pt called in to see if some allergia d could be called in for him to [facility]       Call back   [phone]

## 2019-11-18 ENCOUNTER — TELEPHONE (OUTPATIENT)
Dept: FAMILY MEDICINE CLINIC | Facility: CLINIC | Age: 39
End: 2019-11-18

## 2019-11-18 DIAGNOSIS — Z30.2 REQUEST FOR STERILIZATION: Primary | ICD-10-CM

## 2019-11-18 NOTE — TELEPHONE ENCOUNTER
PATIENT IS CALLING BECAUSE HE WANTS A REFERRAL FOR A VASECTOMY AND WANTS TO KNOW WHO SHE WOULD RECOMMEND.

## 2019-11-19 NOTE — TELEPHONE ENCOUNTER
I have not seen him in 2 years, will you take care of this if not pt will need to be seen by me. ajc

## 2019-11-25 ENCOUNTER — TELEPHONE (OUTPATIENT)
Dept: FAMILY MEDICINE CLINIC | Facility: CLINIC | Age: 39
End: 2019-11-25

## 2020-03-04 ENCOUNTER — TELEPHONE (OUTPATIENT)
Dept: FAMILY MEDICINE CLINIC | Facility: CLINIC | Age: 40
End: 2020-03-04

## 2020-03-04 DIAGNOSIS — J01.90 ACUTE NON-RECURRENT SINUSITIS, UNSPECIFIED LOCATION: ICD-10-CM

## 2020-03-04 RX ORDER — FEXOFENADINE HCL AND PSEUDOEPHEDRINE HCI 180; 240 MG/1; MG/1
1 TABLET, EXTENDED RELEASE ORAL DAILY PRN
Qty: 30 TABLET | Refills: 1 | Status: SHIPPED | OUTPATIENT
Start: 2020-03-04 | End: 2020-10-05 | Stop reason: SDUPTHER

## 2020-03-04 NOTE — TELEPHONE ENCOUNTER
PT CALLED AND STATED WITH SPRING COMING IF RX CAN SENT TO PHARMACY FOR ALLERGA  D.    PLEASE ADVISE.  CALL BACK: [phone]     PHARM:  [id number] - [place], [place] - [address] [address]

## 2020-09-24 DIAGNOSIS — J01.90 ACUTE NON-RECURRENT SINUSITIS, UNSPECIFIED LOCATION: ICD-10-CM

## 2020-09-24 RX ORDER — FEXOFENADINE HCL AND PSEUDOEPHEDRINE HCI 180; 240 MG/1; MG/1
1 TABLET, EXTENDED RELEASE ORAL DAILY PRN
Qty: 30 TABLET | Refills: 1 | OUTPATIENT
Start: 2020-09-24

## 2020-09-24 NOTE — TELEPHONE ENCOUNTER
Requesting refill on:  fexofenadine-pseudoephedrine (ALLEGRA-D ALLERGY & CONGESTION) 180-240 MG per 24 hr tablet    Send to:  [facility]/pharmacy #[id number] - [place], [place] - [address] [address] [facility] [address] AT [facility] [address] - [phone]  - [phone] FX     Patient requesting several refills, has a physical appt scheduled on 10/5/20. He is out of medication.

## 2020-10-05 ENCOUNTER — OFFICE VISIT (OUTPATIENT)
Dept: FAMILY MEDICINE CLINIC | Facility: CLINIC | Age: 40
End: 2020-10-05

## 2020-10-05 VITALS
SYSTOLIC BLOOD PRESSURE: 126 MMHG | BODY MASS INDEX: 29.77 KG/M2 | OXYGEN SATURATION: 98 % | HEART RATE: 86 BPM | RESPIRATION RATE: 16 BRPM | DIASTOLIC BLOOD PRESSURE: 84 MMHG | HEIGHT: 69 IN | TEMPERATURE: 97.7 F | WEIGHT: 201 LBS

## 2020-10-05 DIAGNOSIS — Z91.09 ENVIRONMENTAL ALLERGIES: ICD-10-CM

## 2020-10-05 DIAGNOSIS — Z11.59 ENCOUNTER FOR HEPATITIS C SCREENING TEST FOR LOW RISK PATIENT: ICD-10-CM

## 2020-10-05 DIAGNOSIS — E78.00 ELEVATED LDL CHOLESTEROL LEVEL: ICD-10-CM

## 2020-10-05 DIAGNOSIS — Z00.00 ANNUAL PHYSICAL EXAM: Primary | ICD-10-CM

## 2020-10-05 PROCEDURE — 99396 PREV VISIT EST AGE 40-64: CPT | Performed by: FAMILY MEDICINE

## 2020-10-05 RX ORDER — FEXOFENADINE HCL AND PSEUDOEPHEDRINE HCI 180; 240 MG/1; MG/1
1 TABLET, EXTENDED RELEASE ORAL DAILY PRN
Qty: 90 TABLET | Refills: 2 | Status: SHIPPED | OUTPATIENT
Start: 2020-10-05 | End: 2021-07-23 | Stop reason: SDUPTHER

## 2020-10-05 NOTE — PATIENT INSTRUCTIONS
Go to the nearest ER or return to clinic if symptoms worsen, fever/chill develop    Preventive Care 40-64 Years Old, Male  Preventive care refers to lifestyle choices and visits with your health care provider that can promote health and wellness. This includes:  · A yearly physical exam. This is also called an annual well check.  · Regular dental and eye exams.  · Immunizations.  · Screening for certain conditions.  · Healthy lifestyle choices, such as eating a healthy diet, getting regular exercise, not using drugs or products that contain nicotine and tobacco, and limiting alcohol use.  What can I expect for my preventive care visit?  Physical exam  Your health care provider will check:  · Height and weight. These may be used to calculate body mass index (BMI), which is a measurement that tells if you are at a healthy weight.  · Heart rate and blood pressure.  · Your skin for abnormal spots.  Counseling  Your health care provider may ask you questions about:  · Alcohol, tobacco, and drug use.  · Emotional well-being.  · Home and relationship well-being.  · Sexual activity.  · Eating habits.  · Work and work environment.  What immunizations do I need?    Influenza (flu) vaccine  · This is recommended every year.  Tetanus, diphtheria, and pertussis (Tdap) vaccine  · You may need a Td booster every 10 years.  Varicella (chickenpox) vaccine  · You may need this vaccine if you have not already been vaccinated.  Zoster (shingles) vaccine  · You may need this after age 60.  Measles, mumps, and rubella (MMR) vaccine  · You may need at least one dose of MMR if you were born in 1957 or later. You may also need a second dose.  Pneumococcal conjugate (PCV13) vaccine  · You may need this if you have certain conditions and were not previously vaccinated.  Pneumococcal polysaccharide (PPSV23) vaccine  · You may need one or two doses if you smoke cigarettes or if you have certain conditions.  Meningococcal conjugate (MenACWY)  vaccine  · You may need this if you have certain conditions.  Hepatitis A vaccine  · You may need this if you have certain conditions or if you travel or work in places where you may be exposed to hepatitis A.  Hepatitis B vaccine  · You may need this if you have certain conditions or if you travel or work in places where you may be exposed to hepatitis B.  Haemophilus influenzae type b (Hib) vaccine  · You may need this if you have certain risk factors.  Human papillomavirus (HPV) vaccine  · If recommended by your health care provider, you may need three doses over 6 months.  You may receive vaccines as individual doses or as more than one vaccine together in one shot (combination vaccines). Talk with your health care provider about the risks and benefits of combination vaccines.  What tests do I need?  Blood tests  · Lipid and cholesterol levels. These may be checked every 5 years, or more frequently if you are over 50 years old.  · Hepatitis C test.  · Hepatitis B test.  Screening  · Lung cancer screening. You may have this screening every year starting at age 55 if you have a 30-pack-year history of smoking and currently smoke or have quit within the past 15 years.  · Prostate cancer screening. Recommendations will vary depending on your family history and other risks.  · Colorectal cancer screening. All adults should have this screening starting at age 50 and continuing until age 75. Your health care provider may recommend screening at age 45 if you are at increased risk. You will have tests every 1-10 years, depending on your results and the type of screening test.  · Diabetes screening. This is done by checking your blood sugar (glucose) after you have not eaten for a while (fasting). You may have this done every 1-3 years.  · Sexually transmitted disease (STD) testing.  Follow these instructions at home:  Eating and drinking  · Eat a diet that includes fresh fruits and vegetables, whole grains, lean protein,  and low-fat dairy products.  · Take vitamin and mineral supplements as recommended by your health care provider.  · Do not drink alcohol if your health care provider tells you not to drink.  · If you drink alcohol:  ? Limit how much you have to 0-2 drinks a day.  ? Be aware of how much alcohol is in your drink. In the [place] one drink equals one 12 oz bottle of beer (355 mL), one 5 oz glass of wine (148 mL), or one 1½ oz glass of hard liquor (44 mL).  Lifestyle  · Take daily care of your teeth and gums.  · Stay active. Exercise for at least 30 minutes on 5 or more days each week.  · Do not use any products that contain nicotine or tobacco, such as cigarettes, e-cigarettes, and chewing tobacco. If you need help quitting, ask your health care provider.  · If you are sexually active, practice safe sex. Use a condom or other form of protection to prevent STIs (sexually transmitted infections).  · Talk with your health care provider about taking a low-dose aspirin every day starting at age 50.  What's next?  · Go to your health care provider once a year for a well check visit.  · Ask your health care provider how often you should have your eyes and teeth checked.  · Stay up to date on all vaccines.  This information is not intended to replace advice given to you by your health care provider. Make sure you discuss any questions you have with your health care provider.  Document Released: 01/13/2017 Document Revised: 12/12/2019 Document Reviewed: 12/12/2019  [employer] Patient Education © 2020 [employer]

## 2020-10-05 NOTE — PROGRESS NOTES
Subjective   Santana Radford is a 40 y.o. male.   Chief Complaint   Patient presents with   • Annual Exam     History of Present Illness   Here to complete annual exam  Dental exam: up to date  Vision exam: overdue  Currently no routine exercise, plans to resume soon.     Has seasonal allergies, treated with prn Allegra-D. If he doesn't take medication, will progress to a sinus infection.   Typically flares in summer and fall.     The following portions of the patient's history were reviewed and updated as appropriate: allergies, current medications, past family history, past medical history, past social history, past surgical history and problem list.    Review of Systems   Constitutional: Negative for activity change and fever.   HENT: Negative.    Respiratory: Negative for cough, chest tightness and shortness of breath.    Cardiovascular: Negative for chest pain and palpitations.   Allergic/Immunologic: Positive for environmental allergies.   Neurological: Negative for light-headedness and headache.   Psychiatric/Behavioral: Negative.        Objective   Physical Exam  Vitals signs and nursing note reviewed.   Constitutional:       Appearance: He is well-developed.   HENT:      Head: Normocephalic and atraumatic.      Nose: Nose normal.   Eyes:      Conjunctiva/sclera: Conjunctivae normal.   Neck:      Musculoskeletal: Neck supple.   Cardiovascular:      Rate and Rhythm: Normal rate and regular rhythm.      Heart sounds: Normal heart sounds. No murmur.   Pulmonary:      Effort: Pulmonary effort is normal.      Breath sounds: Normal breath sounds. No wheezing.   Musculoskeletal:         General: No swelling or deformity.   Lymphadenopathy:      Cervical: No cervical adenopathy.   Skin:     General: Skin is warm and dry.   Neurological:      General: No focal deficit present.      Mental Status: He is alert and oriented to person, place, and time.   Psychiatric:         Mood and Affect: Mood normal.          Behavior: Behavior normal.           Assessment/Plan   Santana was seen today for annual exam.    Diagnoses and all orders for this visit:    Annual physical exam  -     CBC & Differential; Future  -     Comprehensive Metabolic Panel; Future  -     Lipid Panel With / Chol / HDL Ratio; Future    Environmental allergies  -     fexofenadine-pseudoephedrine (Allegra-D Allergy & Congestion) 180-240 MG per 24 hr tablet; Take 1 tablet by mouth Daily As Needed for Allergies.    Elevated LDL cholesterol level    BMI 29.0-29.9,adult  -     CBC & Differential; Future  -     Comprehensive Metabolic Panel; Future  -     Lipid Panel With / Chol / HDL Ratio; Future    Encounter for hepatitis C screening test for low risk patient  -     Hepatitis C Antibody; Future      He will return fasting to complete labs.  Continue PRN use of Allegra-D to treat allergies.    Health advice: healthy food choices with fresh fruits and vegetables, maintain sleep pattern at least 8 hours, avoid texting and distracted driving practices; wear safety belt, engage in regular exercise, maintain healthy weight. Maintain immunizations that are up to date. Maintain health maintenance.  Follow up with PCP if struggling with depression or anxiety. Keep regular dental and eye exams. Brush and floss teeth daily.   F/U annually and prn.

## 2020-10-10 ENCOUNTER — RESULTS ENCOUNTER (OUTPATIENT)
Dept: FAMILY MEDICINE CLINIC | Facility: CLINIC | Age: 40
End: 2020-10-10

## 2020-10-10 DIAGNOSIS — Z00.00 ANNUAL PHYSICAL EXAM: ICD-10-CM

## 2020-10-10 DIAGNOSIS — Z11.59 ENCOUNTER FOR HEPATITIS C SCREENING TEST FOR LOW RISK PATIENT: ICD-10-CM

## 2021-07-23 DIAGNOSIS — Z91.09 ENVIRONMENTAL ALLERGIES: ICD-10-CM

## 2021-07-23 RX ORDER — FEXOFENADINE HCL AND PSEUDOEPHEDRINE HCI 180; 240 MG/1; MG/1
1 TABLET, EXTENDED RELEASE ORAL DAILY PRN
Qty: 90 TABLET | Refills: 2 | Status: SHIPPED | OUTPATIENT
Start: 2021-07-23 | End: 2022-05-05 | Stop reason: SDUPTHER

## 2021-07-23 NOTE — TELEPHONE ENCOUNTER
Caller: Santana Radford    Relationship: Self    Best call back number: [phone]    Medication needed:   Requested Prescriptions     Pending Prescriptions Disp Refills   • fexofenadine-pseudoephedrine (Allegra-D Allergy & Congestion) 180-240 MG per 24 hr tablet 90 tablet 2     Sig: Take 1 tablet by mouth Daily As Needed for Allergies.       What additional details did the patient provide when requesting the medication: PATIENT OUT OF MEDICATION    Does the patient have less than a 3 day supply:  [x] Yes  [] No    What is the patient's preferred pharmacy: [facility]/PH[facility]ACY #[id number] [place], [place] - [address] [place] [address] AT [facility] [address] - [phone]  - [phone] FX

## 2021-10-06 ENCOUNTER — OFFICE VISIT (OUTPATIENT)
Dept: FAMILY MEDICINE CLINIC | Facility: CLINIC | Age: 41
End: 2021-10-06

## 2021-10-06 VITALS
HEIGHT: 69 IN | HEART RATE: 70 BPM | WEIGHT: 195.2 LBS | TEMPERATURE: 97.5 F | RESPIRATION RATE: 18 BRPM | BODY MASS INDEX: 28.91 KG/M2 | OXYGEN SATURATION: 99 % | DIASTOLIC BLOOD PRESSURE: 76 MMHG | SYSTOLIC BLOOD PRESSURE: 120 MMHG

## 2021-10-06 DIAGNOSIS — Z11.59 ENCOUNTER FOR HEPATITIS C SCREENING TEST FOR LOW RISK PATIENT: ICD-10-CM

## 2021-10-06 DIAGNOSIS — J06.9 ACUTE URI: ICD-10-CM

## 2021-10-06 DIAGNOSIS — Z00.00 ANNUAL PHYSICAL EXAM: ICD-10-CM

## 2021-10-06 DIAGNOSIS — E78.00 ELEVATED LDL CHOLESTEROL LEVEL: Primary | ICD-10-CM

## 2021-10-06 PROCEDURE — 99396 PREV VISIT EST AGE 40-64: CPT | Performed by: FAMILY MEDICINE

## 2021-10-06 RX ORDER — PREDNISONE 10 MG/1
TABLET ORAL
Qty: 21 TABLET | Refills: 0 | Status: SHIPPED | OUTPATIENT
Start: 2021-10-06 | End: 2022-05-05

## 2021-10-06 NOTE — PROGRESS NOTES
"Chief Complaint  Annual Exam, runny nose w/cough, and Headache    Subjective          Santana Radford presents to [facility]   Here for annual exam  Vision and dental exams are up to date  No routine exercise, active outside at work.   Declines influenza vaccination.     URI   This is a new problem. The current episode started in the past 7 days. There has been no fever. Associated symptoms include congestion, coughing, rhinorrhea, sinus pain and a sore throat. Pertinent negatives include no ear pain, headaches or plugged ear sensation. Associated symptoms comments: PND  . He has tried antihistamine (Flonase) for the symptoms. The treatment provided no relief.     The following portions of the patient's history were reviewed and updated as appropriate: allergies, current medications, past family history, past medical history, past social history, past surgical history and problem list.    Objective   Vital Signs:   /76   Pulse 70   Temp 97.5 °F (36.4 °C)   Resp 18   Ht 175.3 cm (69\")   Wt 88.5 kg (195 lb 3.2 oz)   SpO2 99%   BMI 28.83 kg/m²     Physical Exam  Vitals and nursing note reviewed.   Constitutional:       General: He is not in acute distress.     Appearance: Normal appearance. He is well-developed.   HENT:      Head: Normocephalic and atraumatic.      Right Ear: Tympanic membrane, ear canal and external ear normal.      Left Ear: Tympanic membrane, ear canal and external ear normal.      Nose: No congestion.      Mouth/Throat:      Pharynx: Oropharyngeal exudate present. No posterior oropharyngeal erythema.   Eyes:      Conjunctiva/sclera: Conjunctivae normal.   Cardiovascular:      Rate and Rhythm: Normal rate and regular rhythm.      Heart sounds: No murmur heard.     Pulmonary:      Effort: Pulmonary effort is normal.      Breath sounds: Normal breath sounds. No wheezing.   Musculoskeletal:         General: No deformity.      Cervical back: Neck supple. "   Skin:     General: Skin is warm and dry.   Neurological:      General: No focal deficit present.      Mental Status: He is alert and oriented to person, place, and time.   Psychiatric:         Mood and Affect: Mood normal.         Behavior: Behavior normal.        Result Review :                 Assessment and Plan    Diagnoses and all orders for this visit:    1. Elevated LDL cholesterol level (Primary)  Comments:  Labs updated today (non-fasting)  Orders:  -     CBC & Differential  -     Comprehensive Metabolic Panel  -     Lipid Panel With / Chol / HDL Ratio    2. Acute URI  Comments:  No sign of bacterial infection. Steroid taper to treat, continue Allegra and Flonase NS  Orders:  -     predniSONE (DELTASONE) 10 MG tablet; Take 60 mg po day 1, 50 mg day 2, 40 mg day 3, 30 mg day 4, 20 mg day 5, 10 mg day 6  Dispense: 21 tablet; Refill: 0    3. Annual physical exam  -     CBC & Differential  -     Comprehensive Metabolic Panel    4. Encounter for hepatitis C screening test for low risk patient  -     Hepatitis C Antibody    Health advice: healthy food choices with fresh fruits and vegetables, maintain sleep pattern at least 8 hours, avoid texting and distracted driving practices; wear safety belt, engage in regular exercise, maintain healthy weight, use safe sex practices, avoid alcohol and illicit drugs. Maintain immunizations that are up to date. Maintain health maintenance. Follow up with PCP if struggling with depression or anxiety. Keep regular dental and eye exams. Brush and floss teeth daily.   F/U annually and prn.         Follow Up   Return in about 1 year (around 10/6/2022) for Annual.  Patient was given instructions and counseling regarding his condition or for health maintenance advice. Please see specific information pulled into the AVS if appropriate.

## 2021-10-06 NOTE — PATIENT INSTRUCTIONS
Go to the nearest ER or return to clinic if symptoms worsen, fever/chill develop    Preventive Care 40-64 Years Old, Male  Preventive care refers to lifestyle choices and visits with your health care provider that can promote health and wellness. This includes:  · A yearly physical exam. This is also called an annual well check.  · Regular dental and eye exams.  · Immunizations.  · Screening for certain conditions.  · Healthy lifestyle choices, such as eating a healthy diet, getting regular exercise, not using drugs or products that contain nicotine and tobacco, and limiting alcohol use.  What can I expect for my preventive care visit?  Physical exam  Your health care provider will check:  · Height and weight. These may be used to calculate body mass index (BMI), which is a measurement that tells if you are at a healthy weight.  · Heart rate and blood pressure.  · Your skin for abnormal spots.  Counseling  Your health care provider may ask you questions about:  · Alcohol, tobacco, and drug use.  · Emotional well-being.  · Home and relationship well-being.  · Sexual activity.  · Eating habits.  · Work and work environment.  What immunizations do I need?    Influenza (flu) vaccine  · This is recommended every year.  Tetanus, diphtheria, and pertussis (Tdap) vaccine  · You may need a Td booster every 10 years.  Varicella (chickenpox) vaccine  · You may need this vaccine if you have not already been vaccinated.  Zoster (shingles) vaccine  · You may need this after age 60.  Measles, mumps, and rubella (MMR) vaccine  · You may need at least one dose of MMR if you were born in 1957 or later. You may also need a second dose.  Pneumococcal conjugate (PCV13) vaccine  · You may need this if you have certain conditions and were not previously vaccinated.  Pneumococcal polysaccharide (PPSV23) vaccine  · You may need one or two doses if you smoke cigarettes or if you have certain conditions.  Meningococcal conjugate (MenACWY)  vaccine  · You may need this if you have certain conditions.  Hepatitis A vaccine  · You may need this if you have certain conditions or if you travel or work in places where you may be exposed to hepatitis A.  Hepatitis B vaccine  · You may need this if you have certain conditions or if you travel or work in places where you may be exposed to hepatitis B.  Haemophilus influenzae type b (Hib) vaccine  · You may need this if you have certain risk factors.  Human papillomavirus (HPV) vaccine  · If recommended by your health care provider, you may need three doses over 6 months.  You may receive vaccines as individual doses or as more than one vaccine together in one shot (combination vaccines). Talk with your health care provider about the risks and benefits of combination vaccines.  What tests do I need?  Blood tests  · Lipid and cholesterol levels. These may be checked every 5 years, or more frequently if you are over 50 years old.  · Hepatitis C test.  · Hepatitis B test.  Screening  · Lung cancer screening. You may have this screening every year starting at age 55 if you have a 30-pack-year history of smoking and currently smoke or have quit within the past 15 years.  · Prostate cancer screening. Recommendations will vary depending on your family history and other risks.  · Colorectal cancer screening. All adults should have this screening starting at age 50 and continuing until age 75. Your health care provider may recommend screening at age 45 if you are at increased risk. You will have tests every 1-10 years, depending on your results and the type of screening test.  · Diabetes screening. This is done by checking your blood sugar (glucose) after you have not eaten for a while (fasting). You may have this done every 1-3 years.  · Sexually transmitted disease (STD) testing.  Follow these instructions at home:  Eating and drinking  · Eat a diet that includes fresh fruits and vegetables, whole grains, lean protein,  and low-fat dairy products.  · Take vitamin and mineral supplements as recommended by your health care provider.  · Do not drink alcohol if your health care provider tells you not to drink.  · If you drink alcohol:  ? Limit how much you have to 0-2 drinks a day.  ? Be aware of how much alcohol is in your drink. In the [place] one drink equals one 12 oz bottle of beer (355 mL), one 5 oz glass of wine (148 mL), or one 1½ oz glass of hard liquor (44 mL).  Lifestyle  · Take daily care of your teeth and gums.  · Stay active. Exercise for at least 30 minutes on 5 or more days each week.  · Do not use any products that contain nicotine or tobacco, such as cigarettes, e-cigarettes, and chewing tobacco. If you need help quitting, ask your health care provider.  · If you are sexually active, practice safe sex. Use a condom or other form of protection to prevent STIs (sexually transmitted infections).  · Talk with your health care provider about taking a low-dose aspirin every day starting at age 50.  What's next?  · Go to your health care provider once a year for a well check visit.  · Ask your health care provider how often you should have your eyes and teeth checked.  · Stay up to date on all vaccines.  This information is not intended to replace advice given to you by your health care provider. Make sure you discuss any questions you have with your health care provider.  Document Revised: 12/12/2019 Document Reviewed: 12/12/2019  [employer] Patient Education © 2020 [employer]

## 2021-10-07 LAB
ALBUMIN SERPL-MCNC: 4.7 G/DL (ref 4–5)
ALBUMIN/GLOB SERPL: 2.1 {RATIO} (ref 1.2–2.2)
ALP SERPL-CCNC: 94 IU/L (ref 44–121)
ALT SERPL-CCNC: 42 IU/L (ref 0–44)
AST SERPL-CCNC: 33 IU/L (ref 0–40)
BASOPHILS # BLD AUTO: 0 X10E3/UL (ref 0–0.2)
BASOPHILS NFR BLD AUTO: 1 %
BILIRUB SERPL-MCNC: 1 MG/DL (ref 0–1.2)
BUN SERPL-MCNC: 17 MG/DL (ref 6–24)
BUN/CREAT SERPL: 17 (ref 9–20)
CALCIUM SERPL-MCNC: 9.2 MG/DL (ref 8.7–10.2)
CHLORIDE SERPL-SCNC: 103 MMOL/L (ref 96–106)
CHOLEST SERPL-MCNC: 182 MG/DL (ref 100–199)
CHOLEST/HDLC SERPL: 3.7 RATIO (ref 0–5)
CO2 SERPL-SCNC: 24 MMOL/L (ref 20–29)
CREAT SERPL-MCNC: 1.03 MG/DL (ref 0.76–1.27)
EOSINOPHIL # BLD AUTO: 0.2 X10E3/UL (ref 0–0.4)
EOSINOPHIL NFR BLD AUTO: 3 %
ERYTHROCYTE [DISTWIDTH] IN BLOOD BY AUTOMATED COUNT: 13.1 % (ref 11.6–15.4)
GLOBULIN SER CALC-MCNC: 2.2 G/DL (ref 1.5–4.5)
GLUCOSE SERPL-MCNC: 90 MG/DL (ref 65–99)
HCT VFR BLD AUTO: 45.3 % (ref 37.5–51)
HCV AB S/CO SERPL IA: <0.1 S/CO RATIO (ref 0–0.9)
HDLC SERPL-MCNC: 49 MG/DL
HGB BLD-MCNC: 15.2 G/DL (ref 13–17.7)
IMM GRANULOCYTES # BLD AUTO: 0 X10E3/UL (ref 0–0.1)
IMM GRANULOCYTES NFR BLD AUTO: 0 %
LDLC SERPL CALC-MCNC: 112 MG/DL (ref 0–99)
LYMPHOCYTES # BLD AUTO: 1.3 X10E3/UL (ref 0.7–3.1)
LYMPHOCYTES NFR BLD AUTO: 23 %
MCH RBC QN AUTO: 29 PG (ref 26.6–33)
MCHC RBC AUTO-ENTMCNC: 33.6 G/DL (ref 31.5–35.7)
MCV RBC AUTO: 86 FL (ref 79–97)
MONOCYTES # BLD AUTO: 0.6 X10E3/UL (ref 0.1–0.9)
MONOCYTES NFR BLD AUTO: 11 %
NEUTROPHILS # BLD AUTO: 3.5 X10E3/UL (ref 1.4–7)
NEUTROPHILS NFR BLD AUTO: 62 %
PLATELET # BLD AUTO: 173 X10E3/UL (ref 150–450)
POTASSIUM SERPL-SCNC: 4.2 MMOL/L (ref 3.5–5.2)
PROT SERPL-MCNC: 6.9 G/DL (ref 6–8.5)
RBC # BLD AUTO: 5.25 X10E6/UL (ref 4.14–5.8)
SODIUM SERPL-SCNC: 140 MMOL/L (ref 134–144)
TRIGL SERPL-MCNC: 115 MG/DL (ref 0–149)
VLDLC SERPL CALC-MCNC: 21 MG/DL (ref 5–40)
WBC # BLD AUTO: 5.7 X10E3/UL (ref 3.4–10.8)

## 2021-12-23 ENCOUNTER — TELEPHONE (OUTPATIENT)
Dept: FAMILY MEDICINE CLINIC | Facility: CLINIC | Age: 41
End: 2021-12-23

## 2021-12-23 RX ORDER — AMOXICILLIN 500 MG/1
500 CAPSULE ORAL 3 TIMES DAILY
Qty: 30 CAPSULE | Refills: 0 | Status: SHIPPED | OUTPATIENT
Start: 2021-12-23 | End: 2022-05-05

## 2021-12-23 NOTE — TELEPHONE ENCOUNTER
Denies fever, denies loss of taste or smell. Pt is coughing / spitting up green production. Pt has been taking Allegra-D. Symptoms started on Tuesday.

## 2021-12-23 NOTE — TELEPHONE ENCOUNTER
Please call, I sent in amoxicillin 500 mg three times per day. To [facility] this weekend if getting worse.

## 2021-12-23 NOTE — TELEPHONE ENCOUNTER
Caller: Santana Radford    Relationship: Self    Best call back number: [phone]    What medication are you requesting:   ANTIBIOTIC AND PREDNISONE     What are your current symptoms:   SINUS INFECTION, HEADACHE, COUGH AND NASAL DRAINAGE     How long have you been experiencing symptoms:   TWO DAYS     Have you had these symptoms before:    [x] Yes  [] No    Have you been treated for these symptoms before:   [x] Yes  [] No    If a prescription is needed, what is your preferred pharmacy and phone number:      [facility] - [address] [phone] [facility] [phone]   [phone]    Additional notes:  PATIENT STATED THAT HE WOULD LIKE FOR A ANTIBIOTIC AND PREDNISONE TO BE CALLED INTO THE PHARMACY FOR A SINUS INFECTION

## 2022-02-28 ENCOUNTER — TELEPHONE (OUTPATIENT)
Dept: FAMILY MEDICINE CLINIC | Facility: CLINIC | Age: 42
End: 2022-02-28

## 2022-02-28 DIAGNOSIS — Z30.09 STERILIZATION CONSULT: Primary | ICD-10-CM

## 2022-03-25 ENCOUNTER — OFFICE VISIT (OUTPATIENT)
Dept: UROLOGY | Facility: CLINIC | Age: 42
End: 2022-03-25

## 2022-03-25 VITALS — HEIGHT: 69 IN | OXYGEN SATURATION: 99 % | WEIGHT: 194 LBS | HEART RATE: 60 BPM | BODY MASS INDEX: 28.73 KG/M2

## 2022-03-25 DIAGNOSIS — Z30.09 BIRTH CONTROL COUNSELING: Primary | ICD-10-CM

## 2022-03-25 PROCEDURE — 99204 OFFICE O/P NEW MOD 45 MIN: CPT | Performed by: UROLOGY

## 2022-03-25 NOTE — PROGRESS NOTES
Office Note Vasectomy Consult     Patient Name: Santana Radford  : 1980   MRN: [id number]     Chief Complaint:  Elective Sterilization.     Referring Provider: Eugenie Andres DO    History of Present Illness: Santana Radford is a 42 y.o. male who presents to Urology today with the desire for irreversible, elective sterilization.  The patient reports that he is  with biological children.  He reports that he and his spouse have been considering vasectomy.  He denies any lower urinary tract symptoms.  Denies hematuria.  Denies history of urinary tract infection. Denies prior urologic evaluation or instrumentation.      Subjective      Review of Systems: Review of Systems   Constitutional: Negative for chills, fatigue, fever and unexpected weight change.   HENT: Negative for sore throat.    Eyes: Negative for visual disturbance.   Respiratory: Negative for cough, chest tightness and shortness of breath.    Cardiovascular: Negative for chest pain and leg swelling.   Gastrointestinal: Negative for blood in stool, constipation, diarrhea, nausea, rectal pain and vomiting.   Genitourinary: Negative for decreased urine volume, difficulty urinating, dysuria, enuresis, flank pain, frequency, genital sores, hematuria and urgency.   Musculoskeletal: Negative for back pain and joint swelling.   Skin: Negative for rash and wound.   Neurological: Negative for seizures, speech difficulty, weakness and headaches.   Psychiatric/Behavioral: Negative for confusion, sleep disturbance and suicidal ideas. The patient is not nervous/anxious.         I have reviewed the ROS documented by my clinical staff, I have updated appropriately and I agree. Stalin Langston MD    Past Medical History:   Past Medical History:   Diagnosis Date   • Allergic        Past Surgical History: History reviewed. No pertinent surgical history.    Family History:   Family History   Problem Relation Age of Onset   • Hypertension Mother    • No  "Known Problems Father        Social History:   Social History     Socioeconomic History   • Marital status:    Tobacco Use   • Smoking status: Never Smoker   • Smokeless tobacco: Never Used   Substance and Sexual Activity   • Alcohol use: No   • Drug use: No   • Sexual activity: Yes     Partners: Female       Medications:     Current Outpatient Medications:   •  ascorbic acid (VITAMIN C) 1000 MG tablet, Take 1,000 mg by mouth Daily., Disp: , Rfl:   •  fexofenadine-pseudoephedrine (Allegra-D Allergy & Congestion) 180-240 MG per 24 hr tablet, Take 1 tablet by mouth Daily As Needed for Allergies., Disp: 90 tablet, Rfl: 2  •  amoxicillin (AMOXIL) 500 MG capsule, Take 1 capsule by mouth 3 (Three) Times a Day., Disp: 30 capsule, Rfl: 0  •  predniSONE (DELTASONE) 10 MG tablet, Take 60 mg po day 1, 50 mg day 2, 40 mg day 3, 30 mg day 4, 20 mg day 5, 10 mg day 6, Disp: 21 tablet, Rfl: 0    Allergies:   No Known Allergies      Objective     Physical Exam:   Vital Signs:   Vitals:    03/25/22 0914   Pulse: 60   SpO2: 99%   Weight: 88 kg (194 lb)   Height: 175.3 cm (69.02\")   PainSc:   2     Body mass index is 28.64 kg/m².     Physical Exam  Vitals and nursing note reviewed.   Constitutional:       Appearance: Normal appearance.   HENT:      Head: Normocephalic and atraumatic.   Cardiovascular:      Comments: Well perfused  Pulmonary:      Effort: Pulmonary effort is normal.   Abdominal:      General: Abdomen is flat.      Palpations: Abdomen is soft.   Musculoskeletal:         General: Normal range of motion.   Skin:     General: Skin is warm and dry.   Neurological:      General: No focal deficit present.      Mental Status: He is alert and oriented to person, place, and time. Mental status is at baseline.   Psychiatric:         Mood and Affect: Mood normal.         Behavior: Behavior normal.         Thought Content: Thought content normal.         Judgment: Judgment normal.         Genitourinary  Penis: circumcised " penis, glans normal, no penile discharge.  No rashes/lesions.    Testes: descended bilaterally, no masses, nontender to palpation.  Bilateral vas deferens palpable. Remainder of scrotal contents normal. No hernia appreciated.      Labs:   Brief Urine Lab Results     None               Lab Results   Component Value Date    GLUCOSE 90 10/06/2021    CALCIUM 9.2 10/06/2021     10/06/2021    K 4.2 10/06/2021    CO2 24 10/06/2021     10/06/2021    BUN 17 10/06/2021    CREATININE 1.03 10/06/2021    EGFRIFAFRI 103 10/06/2021    EGFRIFNONA 89 10/06/2021    BCR 17 10/06/2021       Lab Results   Component Value Date    WBC 5.7 10/06/2021    HGB 15.2 10/06/2021    HCT 45.3 10/06/2021    MCV 86 10/06/2021     10/06/2021       Images:   No Images in the past 120 days found..    Measures:   Tobacco:   Santana Radford  reports that he has never smoked. He has never used smokeless tobacco.. I have educated him on the risk of diseases from using tobacco products.      Assessment / Plan      Assessment/Plan:   Mr. Radford is a 42 y.o. male who presents today requesting permanent, irreversible surgical sterilization.  The patient denies lower urinary tract symptoms, erectile dysfunction.    Diagnoses and all orders for this visit:    1. Birth control counseling (Primary)         Patient Education:   Today we discussed the risks and benefits of irreversible and permanent surgical sterilization.  The vasectomy procedure was discussed in detail with the patient. Risks including failure of the procedure, infection, bleeding, development of chronic testicular pain, and the possibility of injuring adjacent structures which could potentially result in loss of the testicle were discussed in depth..  Furthermore, the patient was told he would remain fertile following the procedure until he provided a semen analysis after 12 weeks and 20 ejaculations post-procedure. Discussed that semen analysis will be reviewed and he will be  contacted with results. The patient is understanding that any unprotected intercourse can result in pregnancy until he provides a semen analysis at above interval.  He is understanding that he requires birth control method until his semen analysis is performed and completed.The patient expressed understanding and desire to proceed.  He will be scheduled for vasectomy at his convenience.         Follow Up:   Patient will follow up approximately Return in about 27 days (around 4/21/2022). for vasectomy procedure.     I spent approximately 45 minutes providing clinical care for this patient; including review of patient's chart and provider documentation, face to face time spent with patient in examination room (obtaining history, performing physical exam, discussing diagnosis and management options), placing orders, and completing patient documentation.     Stalin Langston MD  [facility] [place]

## 2022-05-05 ENCOUNTER — OFFICE VISIT (OUTPATIENT)
Dept: FAMILY MEDICINE CLINIC | Facility: CLINIC | Age: 42
End: 2022-05-05

## 2022-05-05 VITALS
DIASTOLIC BLOOD PRESSURE: 72 MMHG | RESPIRATION RATE: 20 BRPM | OXYGEN SATURATION: 99 % | SYSTOLIC BLOOD PRESSURE: 130 MMHG | HEART RATE: 70 BPM | BODY MASS INDEX: 29.68 KG/M2 | HEIGHT: 69 IN | TEMPERATURE: 97.8 F | WEIGHT: 200.4 LBS

## 2022-05-05 DIAGNOSIS — Z91.09 ENVIRONMENTAL ALLERGIES: ICD-10-CM

## 2022-05-05 DIAGNOSIS — J06.9 ACUTE URI: Primary | ICD-10-CM

## 2022-05-05 PROCEDURE — 99213 OFFICE O/P EST LOW 20 MIN: CPT | Performed by: FAMILY MEDICINE

## 2022-05-05 RX ORDER — FEXOFENADINE HCL AND PSEUDOEPHEDRINE HCI 180; 240 MG/1; MG/1
1 TABLET, EXTENDED RELEASE ORAL DAILY PRN
Qty: 90 TABLET | Refills: 1 | Status: SHIPPED | OUTPATIENT
Start: 2022-05-05

## 2022-05-05 RX ORDER — PREDNISONE 10 MG/1
TABLET ORAL
Qty: 21 TABLET | Refills: 0 | Status: SHIPPED | OUTPATIENT
Start: 2022-05-05 | End: 2022-07-11

## 2022-05-05 RX ORDER — AMOXICILLIN AND CLAVULANATE POTASSIUM 875; 125 MG/1; MG/1
1 TABLET, FILM COATED ORAL 2 TIMES DAILY
Qty: 20 TABLET | Refills: 0 | Status: SHIPPED | OUTPATIENT
Start: 2022-05-05 | End: 2022-05-15

## 2022-05-05 NOTE — PROGRESS NOTES
"Chief Complaint  stuffy nose, cough, PND  (For the past several days )    Subjective          Santana Radford presents to [facility]  URI   This is a new problem. The current episode started in the past 7 days. The problem has been gradually improving. There has been no fever. Associated symptoms include congestion, coughing and headaches. Pertinent negatives include no sinus pain or sore throat. Associated symptoms comments: PND. He has tried antihistamine and decongestant for the symptoms. The treatment provided no relief.       The following portions of the patient's history were reviewed and updated as appropriate: allergies, current medications, past family history, past medical history, past social history, past surgical history and problem list.    Objective   Vital Signs:   /72   Pulse 70   Temp 97.8 °F (36.6 °C)   Resp 20   Ht 175.3 cm (69.02\")   Wt 90.9 kg (200 lb 6.4 oz)   SpO2 99%   BMI 29.58 kg/m²     Physical Exam  Vitals and nursing note reviewed.   Constitutional:       General: He is not in acute distress.     Appearance: He is well-developed.   HENT:      Head: Normocephalic and atraumatic.      Right Ear: Tympanic membrane, ear canal and external ear normal.      Left Ear: Tympanic membrane, ear canal and external ear normal.      Nose: Congestion present.      Mouth/Throat:      Mouth: Mucous membranes are moist.      Pharynx: Uvula midline. No posterior oropharyngeal erythema.   Eyes:      Conjunctiva/sclera: Conjunctivae normal.   Cardiovascular:      Rate and Rhythm: Normal rate and regular rhythm.   Pulmonary:      Effort: Pulmonary effort is normal.      Breath sounds: Normal breath sounds.   Musculoskeletal:         General: No deformity.   Neurological:      Mental Status: He is alert and oriented to person, place, and time.   Psychiatric:         Behavior: Behavior normal.        Result Review :                 Assessment and Plan  "   Diagnoses and all orders for this visit:    1. Acute URI (Primary)  -     predniSONE (DELTASONE) 10 MG tablet; Take 60 mg po day 1, 50 mg day 2, 40 mg day 3, 30 mg day 4, 20 mg day 5, 10 mg day 6  Dispense: 21 tablet; Refill: 0  -     amoxicillin-clavulanate (Augmentin) 875-125 MG per tablet; Take 1 tablet by mouth 2 (Two) Times a Day for 10 days.  Dispense: 20 tablet; Refill: 0    2. Environmental allergies  -     fexofenadine-pseudoephedrine (Allegra-D Allergy & Congestion) 180-240 MG per 24 hr tablet; Take 1 tablet by mouth Daily As Needed for Allergies.  Dispense: 90 tablet; Refill: 1    He will start steroid taper to treat URI symptoms, likely related to allergies versus viral source.  If symptoms are not improving over the next 48 hours, okay to start Augmentin      Follow Up   Return if symptoms worsen or fail to improve.  Patient was given instructions and counseling regarding his condition or for health maintenance advice. Please see specific information pulled into the AVS if appropriate.

## 2022-05-17 ENCOUNTER — TELEPHONE (OUTPATIENT)
Dept: FAMILY MEDICINE CLINIC | Facility: CLINIC | Age: 42
End: 2022-05-17

## 2022-05-17 RX ORDER — DOXYCYCLINE 100 MG/1
100 CAPSULE ORAL EVERY 12 HOURS SCHEDULED
Qty: 20 CAPSULE | Refills: 0 | Status: SHIPPED | OUTPATIENT
Start: 2022-05-17 | End: 2022-05-27

## 2022-05-17 NOTE — TELEPHONE ENCOUNTER
Caller: Santana Radford    Relationship: Self    Best call back number: [phone]    What is the best time to reach you: anytime    Who are you requesting to speak with (clinical staff, provider,  specific staff member): Dr. Andres    Do you know the name of the person who called:     What was the call regarding: patient states antibiotic isn't working and would like a different one called in    Do you require a callback: YES

## 2022-06-16 ENCOUNTER — PROCEDURE VISIT (OUTPATIENT)
Dept: UROLOGY | Facility: CLINIC | Age: 42
End: 2022-06-16

## 2022-06-16 DIAGNOSIS — Z98.52 S/P VASECTOMY: Primary | ICD-10-CM

## 2022-06-16 PROCEDURE — 55250 REMOVAL OF SPERM DUCT(S): CPT | Performed by: UROLOGY

## 2022-06-16 NOTE — PROGRESS NOTES
Preprocedure diagnosis  Unwanted Fertility     Postprocedure diagnosis  Unwanted Fertility     Procedure  Bilateral Vasectomy     Attending surgeon  Stalin Langston MD     Anesthesia  1% Lidocaine mixed with 0.5% Bupivicaine local anesthetic      Complications  None     Indications  42 y.o. male who desires desires elective sterility presents for vasectomy.  Informed consent was reviewed and signed.  Risks, benefits, alternatives to the procedure were discussed.      Findings  - Uncomplicated bilateral vasectomy  - 1 cm segments of the bilateral vas deferens sent to pathology     Procedure  The patient was identified and informed consent was reviewed and signed.  He was placed in the supine position.  His genitals were prepped and draped in sterile fashion.  The right vas deferens was isolated. Local anesthetic was injected at the skin and deep to the dartos tissue.    After anesthesia confirmed, an 11 blade scalpel was used to make a small skin incision in the right lateral hemiscrotum.   A ring clamp was used to isolate the vas deferens and pull this out through the skin. Bovie cautery to cauterize  perivasal vessels.  The right vas deferens was skeletonized over an area of 1-1/2 cm.  2 hemostat clamps were used to clamp the testicular and abdominal ends of the vas deferens.  Between the area of the hemostat a 1 cm segment of the vas was transected with Metzenbaum scissors.  Electrocautery was used to cauterize the ends of the vas deferens. The right sided segment was sent off to pathology.  Using a clip applier clips were placed on both the testicular and abdominal ends of the vas deferens.  Once hemostasis was confirmed the vas deferens and tissue was delivered back into the scrotum. A 4-0 chromic suture was used to sew a horizontal mattress stitch at the skin incision.  Hemostasis was confirmed.      And identical procedure was performed on the left side.  The left-sided vas deferens segment was sent off to  pathology.  The skin was closed with a horizontal mattress 4-0 chromic suture again.  Neosporin was applied over the incisions and scrotal fluff gauze were placed.  The patient tolerated the procedure well.     Follow Up: Patient will complete 20 ejaculations and 12 weeks before he provides us a semen analysis.  He was sent home with bacitracin to apply over his incision for 7 days.  Return precautions discussed at length.  The patient is understanding that any unprotected intercourse can result in pregnancy until he provides a semen analysis at above interval.  He is understanding that he requires birth control method until his semen analysis is performed and completed.  He expresses understanding and agreement with this.  Will be contacted once his semen analysis performed.

## 2022-06-17 LAB — REF LAB TEST METHOD: NORMAL

## 2022-07-11 ENCOUNTER — TELEPHONE (OUTPATIENT)
Dept: FAMILY MEDICINE CLINIC | Facility: CLINIC | Age: 42
End: 2022-07-11

## 2022-07-11 ENCOUNTER — TELEMEDICINE (OUTPATIENT)
Dept: FAMILY MEDICINE CLINIC | Facility: TELEHEALTH | Age: 42
End: 2022-07-11

## 2022-07-11 DIAGNOSIS — J01.11 ACUTE RECURRENT FRONTAL SINUSITIS: Primary | ICD-10-CM

## 2022-07-11 PROCEDURE — 99213 OFFICE O/P EST LOW 20 MIN: CPT | Performed by: NURSE PRACTITIONER

## 2022-07-11 RX ORDER — DOXYCYCLINE 100 MG/1
100 TABLET ORAL 2 TIMES DAILY
Qty: 20 TABLET | Refills: 0 | Status: SHIPPED | OUTPATIENT
Start: 2022-07-11 | End: 2022-07-21

## 2022-07-11 RX ORDER — PREDNISONE 10 MG/1
TABLET ORAL
Qty: 21 TABLET | Refills: 0 | Status: SHIPPED | OUTPATIENT
Start: 2022-07-11 | End: 2022-10-14

## 2022-07-11 NOTE — TELEPHONE ENCOUNTER
Caller: Santana Radford    Relationship: Self    Best call back number:[phone]    What medication are you requesting: ANTIBIOTIC/ PREDISONE    What are your current symptoms: SINUS PRESSURE, NASAL DRAINAGE,  POSSIBLE FEVER    How long have you been experiencing symptoms: 7 DAYS    Have you had these symptoms before:    [x] Yes  [] No    Have you been treated for these symptoms before:   [x] Yes  [] No    If a prescription is needed, what is your preferred pharmacy and phone number:      [facility] - [address] [phone] [facility] [phone]         Additional notes:

## 2022-07-11 NOTE — PATIENT INSTRUCTIONS
-Take all meds as prescribed even if you start feeling better  May use tylenol or warm moist compress on the face for pain. Avoid ibuprofen and sudafed while taking prednisone.  -May use saline nasal spray, netipot or flonase as desired  -If symptoms worsen or do not improve in 1 week follow up with urgent care or your primary care provider

## 2022-07-11 NOTE — PROGRESS NOTES
Subjective   Santana Radford is a 42 y.o. male.     His symptoms started 6 days ago with copious sinus and lots of sinus pressure. His symptoms started with a sore throat related to post-nasal drip. He started feeling better a couple weeks ago and then much worse last night with a low grade fever. The pressure he is feeling is in his forehead between his eyebrows, not in cheeks. The drainage from his nose is very green. He has not been on antibiotics or steroids recently. He has been taking allegra-D for these symptoms but it has not helped. He took Augmentin in May and failed that treatment and had to do a second course of antibiotics. He did try nasal irrigation yesterday and it did temporarily help.       The following portions of the patient's history were reviewed and updated as appropriate: allergies, current medications, past family history, past medical history, past social history, past surgical history, and problem list.    Review of Systems   Constitutional: Positive for fatigue and fever.   HENT: Positive for congestion, postnasal drip, rhinorrhea, sinus pressure and sore throat. Negative for nosebleeds.    Respiratory: Negative for cough and shortness of breath.    Gastrointestinal: Negative.    Musculoskeletal: Negative.    Neurological: Positive for headache.       Objective   Physical Exam  Constitutional:       General: He is not in acute distress.     Appearance: He is well-developed. He is not diaphoretic.   HENT:      Nose:      Right Sinus: No maxillary sinus tenderness or frontal sinus tenderness.      Left Sinus: No maxillary sinus tenderness or frontal sinus tenderness.   Pulmonary:      Effort: Pulmonary effort is normal.   Neurological:      Mental Status: He is alert and oriented to person, place, and time.   Psychiatric:         Behavior: Behavior normal.           Assessment & Plan   Diagnoses and all orders for this visit:    1. Acute recurrent frontal sinusitis (Primary)  -      doxycycline (ADOXA) 100 MG tablet; Take 1 tablet by mouth 2 (Two) Times a Day for 10 days.  Dispense: 20 tablet; Refill: 0  -     predniSONE (DELTASONE) 10 MG tablet; Take 60 mg po day 1, 50 mg day 2, 40 mg day 3, 30 mg day 4, 20 mg day 5, 10 mg day 6  Dispense: 21 tablet; Refill: 0                 The use of a video visit has been reviewed with the patient and verbal informed consent has been obtained. Myself and Santana Radford participated in this visit. The patient is located in [place], [place]. I am located in [place], [place]. Mychart and Zoom were utilized. I spent 20 minutes in the patient's chart for this visit.

## 2022-09-09 ENCOUNTER — TELEPHONE (OUTPATIENT)
Dept: UROLOGY | Facility: CLINIC | Age: 42
End: 2022-09-09

## 2022-09-09 ENCOUNTER — LAB (OUTPATIENT)
Dept: LAB | Facility: HOSPITAL | Age: 42
End: 2022-09-09

## 2022-09-09 DIAGNOSIS — Z30.2 STERILIZATION: Primary | ICD-10-CM

## 2022-09-09 DIAGNOSIS — Z30.2 STERILIZATION: ICD-10-CM

## 2022-09-09 LAB — SPERM - POST VASECTOMY: NORMAL

## 2022-09-09 PROCEDURE — 89321 SEMEN ANAL SPERM DETECTION: CPT

## 2022-09-09 NOTE — TELEPHONE ENCOUNTER
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 No sperm was observed in sample pellet obtained by centrifugation.            Patient was contacted on 9/9/2022.  He was informed that postvasectomy analysis demonstrates no sperm.  He expressed understanding of result.  He may follow-up as needed.

## 2022-10-14 ENCOUNTER — TELEMEDICINE (OUTPATIENT)
Dept: FAMILY MEDICINE CLINIC | Facility: TELEHEALTH | Age: 42
End: 2022-10-14

## 2022-10-14 DIAGNOSIS — J30.9 ALLERGIC SINUSITIS: Primary | ICD-10-CM

## 2022-10-14 PROCEDURE — 99213 OFFICE O/P EST LOW 20 MIN: CPT | Performed by: NURSE PRACTITIONER

## 2022-10-14 RX ORDER — PREDNISONE 10 MG/1
TABLET ORAL DAILY
Qty: 21 EACH | Refills: 0 | Status: SHIPPED | OUTPATIENT
Start: 2022-10-14 | End: 2022-10-20

## 2022-10-14 NOTE — PROGRESS NOTES
Subjective   Chief Complaint   Patient presents with   • Sinusitis       Santana Radford is a 42 y.o. male.     History of Present Illness  Patient reports 2 days of runny nose, watery eyes, sinus pressure and green nasal drainage.  He feels like he has a sinus infection and he usually needs antibiotics.  Sinusitis  This is a new problem. Episode onset: 2 days. The problem is unchanged. There has been no fever. Associated symptoms include sinus pressure. Pertinent negatives include no chills, congestion, coughing, diaphoresis, ear pain, headaches, hoarse voice, shortness of breath, sneezing, sore throat or swollen glands. Treatments tried: allegra-d, flonase. The treatment provided no relief.        No Known Allergies    Past Medical History:   Diagnosis Date   • Allergic        History reviewed. No pertinent surgical history.    Social History     Socioeconomic History   • Marital status:    Tobacco Use   • Smoking status: Never   • Smokeless tobacco: Never   Substance and Sexual Activity   • Alcohol use: No   • Drug use: No   • Sexual activity: Yes     Partners: Female       Family History   Problem Relation Age of Onset   • Hypertension Mother    • No Known Problems Father          Current Outpatient Medications:   •  ascorbic acid (VITAMIN C) 1000 MG tablet, Take 1,000 mg by mouth Daily., Disp: , Rfl:   •  fexofenadine-pseudoephedrine (Allegra-D Allergy & Congestion) 180-240 MG per 24 hr tablet, Take 1 tablet by mouth Daily As Needed for Allergies., Disp: 90 tablet, Rfl: 1  •  predniSONE (DELTASONE) 10 MG (21) dose pack, Take  by mouth Daily for 6 days., Disp: 21 each, Rfl: 0      Review of Systems   Constitutional: Negative for chills, diaphoresis, fatigue and fever.   HENT: Positive for postnasal drip and sinus pressure. Negative for congestion, ear pain, hoarse voice, sneezing, sore throat, swollen glands and voice change.    Eyes: Positive for discharge (watery).   Respiratory: Negative for cough,  chest tightness, shortness of breath and wheezing.    Cardiovascular: Negative for chest pain and palpitations.   Musculoskeletal: Negative for myalgias.   Neurological: Negative for headache.        There were no vitals filed for this visit.    Objective   Physical Exam  Constitutional:       General: He is not in acute distress.     Appearance: Normal appearance. He is not ill-appearing, toxic-appearing or diaphoretic.   HENT:      Head: Normocephalic.      Nose: Rhinorrhea present. No congestion.      Right Sinus: Maxillary sinus tenderness and frontal sinus tenderness present.      Left Sinus: Maxillary sinus tenderness and frontal sinus tenderness present.      Comments: Per pt.   frequent throat clearing during vist       Mouth/Throat:      Lips: Pink.      Mouth: Mucous membranes are moist.   Pulmonary:      Effort: Pulmonary effort is normal.   Neurological:      Mental Status: He is alert and oriented to person, place, and time.   Psychiatric:         Mood and Affect: Mood normal.         Behavior: Behavior normal.          Procedures     Assessment & Plan   Diagnoses and all orders for this visit:    1. Allergic sinusitis (Primary)  -     predniSONE (DELTASONE) 10 MG (21) dose pack; Take  by mouth Daily for 6 days.  Dispense: 21 each; Refill: 0    Continue Allegra-D and Flonase with prescribed steroids.  Tylenol for pain and/or fever, stay hydrated and rest.   If sinus pressure and symptoms persist you may message me through [employer] for antibiotics.    Sinusitis requiring antibiotics is generally diagnosed in the following situations:  • No symptom improvement after 10 days  • Onset of high fever and purulent nasal drainage, or facial pain for more than 3-4 days  • Worsening symptoms following a viral upper respiratory virus that was initially improving  Because you report having symptoms for only 2 days and no fever it is unlikely that you need an antibiotic at this time. Majority of cases are viral or  allergy related and do not require antibiotics at all.      If symptoms worsen or do not improve follow up with your PCP or visit your nearest Urgent Care Center or ER.    PLAN: Discussed with patient that with just 2 days of symptoms I would lean towards allergic sinusitis rather than bacterial and advised that I do not think he needs antibiotics at this time.  Recommended that he try prednisone along with OTC medication first and if symptoms do not improve he can message me through [employer] and I will send antibiotics at that time.  Discussed dosing, side effects, recommended other symptomatic care.  Patient should follow up with primary care provider, Urgent Care or ER if symptoms worsen, fail to resolve or other symptoms need attention. Patient/family agree to the above.         LYN Brown     The use of a video visit has been reviewed with the patient and verbal informed consent has been obtained. Myself and Santana Radford participated in this visit. The patient is located at [address] [place] [address]. I am located in [place], [place]. Service[employer] and Zoom were utilized.        This visit was performed via Telehealth.  This patient has been instructed to follow-up with their primary care provider if their symptoms worsen or the treatment provided does not resolve their illness.

## 2022-10-14 NOTE — PATIENT INSTRUCTIONS
Continue Allegra-D and Flonase with prescribed steroids.  Tylenol for pain and/or fever, stay hydrated and rest.   If sinus pressure and symptoms persist you may message me through [employer] for antibiotics.    Sinusitis requiring antibiotics is generally diagnosed in the following situations:  No symptom improvement after 10 days  Onset of high fever and purulent nasal drainage, or facial pain for more than 3-4 days  Worsening symptoms following a viral upper respiratory virus that was initially improving  Because you report having symptoms for only 2 days and no fever it is unlikely that you need an antibiotic at this time. Majority of cases are viral or allergy related and do not require antibiotics at all.      If symptoms worsen or do not improve follow up with your PCP or visit your nearest Urgent Care Center or ER.

## 2023-02-10 ENCOUNTER — TELEMEDICINE (OUTPATIENT)
Dept: FAMILY MEDICINE CLINIC | Facility: TELEHEALTH | Age: 43
End: 2023-02-10
Payer: COMMERCIAL

## 2023-02-10 DIAGNOSIS — J01.40 ACUTE PANSINUSITIS, RECURRENCE NOT SPECIFIED: Primary | ICD-10-CM

## 2023-02-10 PROCEDURE — 99213 OFFICE O/P EST LOW 20 MIN: CPT | Performed by: NURSE PRACTITIONER

## 2023-02-10 RX ORDER — METHYLPREDNISOLONE 4 MG/1
TABLET ORAL
Qty: 21 TABLET | Refills: 0 | Status: SHIPPED | OUTPATIENT
Start: 2023-02-10

## 2023-02-10 NOTE — PROGRESS NOTES
CHIEF COMPLAINT  Chief Complaint   Patient presents with   • Sinusitis         HPI  Santana Radford is a 43 y.o. male  presents with complaint of sinusitis symptoms that started last weekend. He usually takes prednisone for sinus inflammation.       Review of Systems   Constitutional: Negative for chills, diaphoresis, fatigue and fever.   HENT: Positive for congestion, postnasal drip, sinus pressure and sinus pain. Negative for rhinorrhea, sneezing and sore throat.    Respiratory: Positive for cough. Negative for apnea, shortness of breath and wheezing.    Cardiovascular: Negative for chest pain.   Gastrointestinal: Negative for diarrhea, nausea and vomiting.   Neurological: Positive for headaches.       Past Medical History:   Diagnosis Date   • Allergic        Family History   Problem Relation Age of Onset   • Hypertension Mother    • No Known Problems Father        Social History     Socioeconomic History   • Marital status:    Tobacco Use   • Smoking status: Never   • Smokeless tobacco: Never   Vaping Use   • Vaping Use: Former   Substance and Sexual Activity   • Alcohol use: No   • Drug use: No   • Sexual activity: Yes     Partners: Female       Santana Radford  reports that he has never smoked. He has never used smokeless tobacco.     There were no vitals taken for this visit.    PHYSICAL EXAM  Physical Exam   Constitutional: He is oriented to person, place, and time. He appears well-developed and well-nourished. He does not have a sickly appearance. He does not appear ill. No distress.   HENT:   Head: Normocephalic and atraumatic.   Eyes: EOM are normal.   Neck: Neck normal appearance.  Pulmonary/Chest: Effort normal.  No respiratory distress.  Neurological: He is alert and oriented to person, place, and time.   Skin: Skin is dry.   Psychiatric: He has a normal mood and affect.         Diagnoses and all orders for this visit:    1. Acute pansinusitis, recurrence not specified (Primary)    Other  orders  -     methylPREDNISolone (MEDROL) 4 MG dose pack; Take as directed on package instructions.  Dispense: 21 tablet; Refill: 0        The use of a video visit has been reviewed with the patient and verbal informed consent has been obtained. Myself and Santana Radford participated in this visit. The patient is located in [address] [place] [address]. I am located in [place], [place]. [employer] and [employer]ilio were utilized.       Note Disclaimer: At [facility], we believe that sharing information builds trust and better   relationships. You are receiving this note because you recently visited [facility]. It is possible you   will see health information before a provider has talked with you about it. This kind of information can   be easy to misunderstand. To help you fully understand what it means for your health, we urge you to   discuss this note with your provider.    Jen Abel, LYN  02/10/2023  11:19 EST

## 2023-02-10 NOTE — PATIENT INSTRUCTIONS
Drink plenty of water  Over the counter pain relievers okay   If symptoms do not improve in 3-5 days follow up with your primary care provider or urgent care    Sinusitis, Adult  Sinusitis is soreness and swelling (inflammation) of your sinuses. Sinuses are hollow spaces in the bones around your face. They are located:  Around your eyes.  In the middle of your forehead.  Behind your nose.  In your cheekbones.  Your sinuses and nasal passages are lined with a fluid called mucus. Mucus drains out of your sinuses. Swelling can trap mucus in your sinuses. This lets germs (bacteria, virus, or fungus) grow, which leads to infection. Most of the time, this condition is caused by a virus.  What are the causes?  This condition is caused by:  Allergies.  Asthma.  Germs.  Things that block your nose or sinuses.  Growths in the nose (nasal polyps).  Chemicals or irritants in the air.  Fungus (rare).  What increases the risk?  You are more likely to develop this condition if:  You have a weak body defense system (immune system).  You do a lot of swimming or diving.  You use nasal sprays too much.  You smoke.  What are the signs or symptoms?  The main symptoms of this condition are pain and a feeling of pressure around the sinuses. Other symptoms include:  Stuffy nose (congestion).  Runny nose (drainage).  Swelling and warmth in the sinuses.  Headache.  Toothache.  A cough that may get worse at night.  Mucus that collects in the throat or the back of the nose (postnasal drip).  Being unable to smell and taste.  Being very tired (fatigue).  A fever.  Sore throat.  Bad breath.  How is this diagnosed?  This condition is diagnosed based on:  Your symptoms.  Your medical history.  A physical exam.  Tests to find out if your condition is short-term (acute) or long-term (chronic). Your doctor may:  Check your nose for growths (polyps).  Check your sinuses using a tool that has a light (endoscope).  Check for allergies or germs.  Do  imaging tests, such as an MRI or CT scan.  How is this treated?  Treatment for this condition depends on the cause and whether it is short-term or long-term.  If caused by a virus, your symptoms should go away on their own within 10 days. You may be given medicines to relieve symptoms. They include:  Medicines that shrink swollen tissue in the nose.  Medicines that treat allergies (antihistamines).  A spray that treats swelling of the nostrils.   Rinses that help get rid of thick mucus in your nose (nasal saline washes).  If caused by bacteria, your doctor may wait to see if you will get better without treatment. You may be given antibiotic medicine if you have:  A very bad infection.  A weak body defense system.  If caused by growths in the nose, you may need to have surgery.  Follow these instructions at home:  Medicines  Take, use, or apply over-the-counter and prescription medicines only as told by your doctor. These may include nasal sprays.  If you were prescribed an antibiotic medicine, take it as told by your doctor. Do not stop taking the antibiotic even if you start to feel better.  Hydrate and humidify    Drink enough water to keep your pee (urine) pale yellow.  Use a cool mist humidifier to keep the humidity level in your home above 50%.  Breathe in steam for 10-15 minutes, 3-4 times a day, or as told by your doctor. You can do this in the bathroom while a hot shower is running.  Try not to spend time in cool or dry air.  Rest  Rest as much as you can.  Sleep with your head raised (elevated).  Make sure you get enough sleep each night.  General instructions    Put a warm, moist washcloth on your face 3-4 times a day, or as often as told by your doctor. This will help with discomfort.  Wash your hands often with soap and water. If there is no soap and water, use hand .  Do not smoke. Avoid being around people who are smoking (secondhand smoke).  Keep all follow-up visits as told by your doctor.  This is important.  Contact a doctor if:  You have a fever.  Your symptoms get worse.  Your symptoms do not get better within 10 days.  Get help right away if:  You have a very bad headache.  You cannot stop throwing up (vomiting).  You have very bad pain or swelling around your face or eyes.  You have trouble seeing.  You feel confused.  Your neck is stiff.  You have trouble breathing.  Summary  Sinusitis is swelling of your sinuses. Sinuses are hollow spaces in the bones around your face.  This condition is caused by tissues in your nose that become inflamed or swollen. This traps germs. These can lead to infection.  If you were prescribed an antibiotic medicine, take it as told by your doctor. Do not stop taking it even if you start to feel better.  Keep all follow-up visits as told by your doctor. This is important.  This information is not intended to replace advice given to you by your health care provider. Make sure you discuss any questions you have with your health care provider.  Document Revised: 05/20/2019 Document Reviewed: 05/20/2019  [employer] Patient Education © 2022 [employer]

## 2024-05-20 ENCOUNTER — TELEPHONE (OUTPATIENT)
Dept: FAMILY MEDICINE CLINIC | Facility: CLINIC | Age: 44
End: 2024-05-20
Payer: COMMERCIAL

## 2024-05-20 NOTE — TELEPHONE ENCOUNTER
PATIENT CALLED WITH A COMPLAINT OF CONSISTENT HEEL PAIN AND IS WANTING TO REQUEST A REFERRAL TO PODIATRY      PLEASE ADVISE

## 2024-05-23 ENCOUNTER — OFFICE VISIT (OUTPATIENT)
Dept: FAMILY MEDICINE CLINIC | Facility: CLINIC | Age: 44
End: 2024-05-23
Payer: COMMERCIAL

## 2024-05-23 VITALS
TEMPERATURE: 97.1 F | SYSTOLIC BLOOD PRESSURE: 112 MMHG | RESPIRATION RATE: 16 BRPM | HEART RATE: 70 BPM | OXYGEN SATURATION: 97 % | HEIGHT: 69 IN | BODY MASS INDEX: 29.95 KG/M2 | WEIGHT: 202.2 LBS | DIASTOLIC BLOOD PRESSURE: 72 MMHG

## 2024-05-23 DIAGNOSIS — Z91.09 ENVIRONMENTAL ALLERGIES: ICD-10-CM

## 2024-05-23 DIAGNOSIS — Z00.8 ENCOUNTER FOR BIOMETRIC SCREENING: ICD-10-CM

## 2024-05-23 DIAGNOSIS — Z00.00 ANNUAL PHYSICAL EXAM: Primary | ICD-10-CM

## 2024-05-23 DIAGNOSIS — M72.2 PLANTAR FASCIITIS, BILATERAL: ICD-10-CM

## 2024-05-23 DIAGNOSIS — Z12.11 SCREENING FOR COLON CANCER: ICD-10-CM

## 2024-05-23 PROCEDURE — 99396 PREV VISIT EST AGE 40-64: CPT | Performed by: FAMILY MEDICINE

## 2024-05-23 RX ORDER — FEXOFENADINE HCL AND PSEUDOEPHEDRINE HCI 180; 240 MG/1; MG/1
1 TABLET, EXTENDED RELEASE ORAL DAILY PRN
Qty: 90 TABLET | Refills: 1 | Status: SHIPPED | OUTPATIENT
Start: 2024-05-23

## 2024-05-23 RX ORDER — MELOXICAM 7.5 MG/1
7.5 TABLET ORAL DAILY
COMMUNITY
Start: 2024-05-22

## 2024-05-23 NOTE — PROGRESS NOTES
Chief Complaint  Follow-up (Routine f/u. )    Subjective          Santana Radford presents to [facility]    History of Present Illness  The patient is a 44-year-old male who presents for evaluation of multiple medical concerns.    The patient sought a referral at [facility] for a diagnosis of plantar fasciitis, a condition he initially attributed to his condition. The condition has progressively worsened, with the right foot being more affected than the left. A steroid injection was administered to his foot, which significantly alleviated the pain in his foot. He is expected to acquire orthotics and is unable to exercise due to the pain.    The patient has not undergone routine blood work and is interested in undergoing labs today for biometric screening. He has undergone a colonoscopy in the past. He maintains regular dental check-ups, although he has not had a vision exam recently. His current medications include Allegra-D for allergy flare-ups.      The following portions of the patient's history were reviewed and updated as appropriate: allergies, current medications, past family history, past medical history, past social history, past surgical history, and problem list.    Objective      Physical Exam  Vitals reviewed.   HENT:      Right Ear: Tympanic membrane, ear canal and external ear normal.      Left Ear: Tympanic membrane, ear canal and external ear normal.      Nose: Nose normal.      Mouth/Throat:      Mouth: Mucous membranes are moist.      Pharynx: Oropharynx is clear.   Cardiovascular:      Rate and Rhythm: Normal rate.      Heart sounds: Normal heart sounds.   Pulmonary:      Effort: Pulmonary effort is normal.      Breath sounds: Normal breath sounds.   Musculoskeletal:      Cervical back: Neck supple.   Neurological:      Mental Status: He is alert.   Psychiatric:         Mood and Affect: Mood normal.         Behavior: Behavior normal.        Physical  Exam      Result Review :       Results               Assessment and Plan    Diagnoses and all orders for this visit:    1. Annual physical exam (Primary)    2. Plantar fasciitis, bilateral  -     Comprehensive Metabolic Panel  -     Lipid Panel With / Chol / HDL Ratio  -     CBC (No Diff)    3. Environmental allergies  -     fexofenadine-pseudoephedrine (Allegra-D Allergy & Congestion) 180-240 MG per 24 hr tablet; Take 1 tablet by mouth Daily As Needed for Allergies.  Dispense: 90 tablet; Refill: 1  -     Comprehensive Metabolic Panel  -     Lipid Panel With / Chol / HDL Ratio  -     CBC (No Diff)    4. Encounter for biometric screening  -     Comprehensive Metabolic Panel  -     Lipid Panel With / Chol / HDL Ratio  -     CBC (No Diff)    5. Screening for colon cancer  -     Cologuard - Stool, Per Rectum; Future      Assessment & Plan    The patient was informed about the availability of allergy injections at our facility. A prescription for Allegra-D will be refilled.    Health maintenance.  The patient will undergo routine laboratory tests today. A Cologuard test will be ordered for the patient.  Health advice: healthy food choices with fresh fruits and vegetables, maintain sleep pattern at least 8 hours, avoid texting and distracted driving practices; wear safety belt, engage in regular exercise, maintain healthy weight. Maintain immunizations that are up to date. Maintain health maintenance.  Follow up with PCP if struggling with depression or anxiety. Keep regular dental and eye exams. Brush and floss teeth daily.   F/U annually and prn.           Follow Up   Return in about 1 year (around 5/23/2025) for Annual.      Patient was given instrPatient or patient representative verbalized consent for the use of Ambient Listening during the visit with  Eugenie Andres DO for chart documentation. 5/23/2024  18:06 EDTuctions and counseling regarding his condition or for health maintenance advice. Please see  specific information pulled into the AVS if appropriate.

## 2024-05-24 LAB
ALBUMIN SERPL-MCNC: 4.5 G/DL (ref 4.1–5.1)
ALBUMIN/GLOB SERPL: 2.1 {RATIO} (ref 1.2–2.2)
ALP SERPL-CCNC: 94 IU/L (ref 44–121)
ALT SERPL-CCNC: 32 IU/L (ref 0–44)
AST SERPL-CCNC: 22 IU/L (ref 0–40)
BILIRUB SERPL-MCNC: 0.6 MG/DL (ref 0–1.2)
BUN SERPL-MCNC: 19 MG/DL (ref 6–24)
BUN/CREAT SERPL: 19 (ref 9–20)
CALCIUM SERPL-MCNC: 9.1 MG/DL (ref 8.7–10.2)
CHLORIDE SERPL-SCNC: 103 MMOL/L (ref 96–106)
CHOLEST SERPL-MCNC: 216 MG/DL (ref 100–199)
CHOLEST/HDLC SERPL: 4.5 RATIO (ref 0–5)
CO2 SERPL-SCNC: 22 MMOL/L (ref 20–29)
CREAT SERPL-MCNC: 0.98 MG/DL (ref 0.76–1.27)
EGFRCR SERPLBLD CKD-EPI 2021: 97 ML/MIN/1.73
ERYTHROCYTE [DISTWIDTH] IN BLOOD BY AUTOMATED COUNT: 13.2 % (ref 11.6–15.4)
GLOBULIN SER CALC-MCNC: 2.1 G/DL (ref 1.5–4.5)
GLUCOSE SERPL-MCNC: 104 MG/DL (ref 70–99)
HCT VFR BLD AUTO: 45.4 % (ref 37.5–51)
HDLC SERPL-MCNC: 48 MG/DL
HGB BLD-MCNC: 15.7 G/DL (ref 13–17.7)
LDLC SERPL CALC-MCNC: 129 MG/DL (ref 0–99)
MCH RBC QN AUTO: 29.5 PG (ref 26.6–33)
MCHC RBC AUTO-ENTMCNC: 34.6 G/DL (ref 31.5–35.7)
MCV RBC AUTO: 85 FL (ref 79–97)
PLATELET # BLD AUTO: 202 X10E3/UL (ref 150–450)
POTASSIUM SERPL-SCNC: 4.4 MMOL/L (ref 3.5–5.2)
PROT SERPL-MCNC: 6.6 G/DL (ref 6–8.5)
RBC # BLD AUTO: 5.32 X10E6/UL (ref 4.14–5.8)
SODIUM SERPL-SCNC: 141 MMOL/L (ref 134–144)
TRIGL SERPL-MCNC: 223 MG/DL (ref 0–149)
VLDLC SERPL CALC-MCNC: 39 MG/DL (ref 5–40)
WBC # BLD AUTO: 6.9 X10E3/UL (ref 3.4–10.8)

## 2024-05-26 LAB
HBA1C MFR BLD: 5.4 % (ref 4.8–5.6)
WRITTEN AUTHORIZATION: NORMAL

## 2025-01-27 ENCOUNTER — OFFICE VISIT (OUTPATIENT)
Dept: FAMILY MEDICINE CLINIC | Facility: CLINIC | Age: 45
End: 2025-01-27
Payer: COMMERCIAL

## 2025-01-27 VITALS
HEART RATE: 88 BPM | BODY MASS INDEX: 30.96 KG/M2 | DIASTOLIC BLOOD PRESSURE: 78 MMHG | SYSTOLIC BLOOD PRESSURE: 114 MMHG | OXYGEN SATURATION: 96 % | WEIGHT: 209 LBS | RESPIRATION RATE: 20 BRPM | HEIGHT: 69 IN | TEMPERATURE: 98 F

## 2025-01-27 DIAGNOSIS — J01.40 ACUTE NON-RECURRENT PANSINUSITIS: Primary | ICD-10-CM

## 2025-01-27 DIAGNOSIS — Z91.09 ENVIRONMENTAL ALLERGIES: ICD-10-CM

## 2025-01-27 DIAGNOSIS — R05.1 ACUTE COUGH: ICD-10-CM

## 2025-01-27 PROCEDURE — 99213 OFFICE O/P EST LOW 20 MIN: CPT | Performed by: FAMILY MEDICINE

## 2025-01-27 RX ORDER — FEXOFENADINE HCL AND PSEUDOEPHEDRINE HCL 180; 240 MG/1; MG/1
1 TABLET, EXTENDED RELEASE ORAL DAILY PRN
Qty: 90 TABLET | Refills: 1 | Status: SHIPPED | OUTPATIENT
Start: 2025-01-27

## 2025-01-27 RX ORDER — DOXYCYCLINE 100 MG/1
100 CAPSULE ORAL EVERY 12 HOURS SCHEDULED
Qty: 20 CAPSULE | Refills: 0 | Status: SHIPPED | OUTPATIENT
Start: 2025-01-27 | End: 2025-02-06

## 2025-01-27 RX ORDER — DEXTROMETHORPHAN HYDROBROMIDE AND PROMETHAZINE HYDROCHLORIDE 15; 6.25 MG/5ML; MG/5ML
5 SYRUP ORAL 4 TIMES DAILY PRN
Qty: 180 ML | Refills: 0 | Status: SHIPPED | OUTPATIENT
Start: 2025-01-27

## 2025-01-27 NOTE — PROGRESS NOTES
Chief Complaint  Cough (Cough, congestion, sinus pressure, started about 3 wks ago. Just finished amoxicillin. Still has cough, and headache in the morning. )    Subjective          Santana Radford presents to [facility]  URI   This is a new problem. The current episode started 1 to 4 weeks ago (3 weeks). Associated symptoms include congestion, coughing (dry), headaches (sinus) and sinus pain. Pertinent negatives include no ear pain or sore throat. Treatments tried: Amoxicillin, Allegra-D. The treatment provided mild relief.     Also requesting a refill of Allegra-D    The following portions of the patient's history were reviewed and updated as appropriate: allergies, current medications, past family history, past medical history, past social history, past surgical history, and problem list.    Objective      Physical Exam  Vitals reviewed.   HENT:      Right Ear: Tympanic membrane, ear canal and external ear normal.      Left Ear: Tympanic membrane, ear canal and external ear normal.   Cardiovascular:      Rate and Rhythm: Normal rate.      Heart sounds: Normal heart sounds.   Pulmonary:      Effort: Pulmonary effort is normal.      Breath sounds: Normal breath sounds.   Neurological:      Mental Status: He is alert.        Result Review :                Assessment and Plan    Diagnoses and all orders for this visit:    1. Acute non-recurrent pansinusitis (Primary)  -     doxycycline (MONODOX) 100 MG capsule; Take 1 capsule by mouth Every 12 (Twelve) Hours for 10 days.  Dispense: 20 capsule; Refill: 0    2. Acute cough  -     promethazine-dextromethorphan (PROMETHAZINE-DM) 6.25-15 MG/5ML syrup; Take 5 mL by mouth 4 (Four) Times a Day As Needed for Cough.  Dispense: 180 mL; Refill: 0    3. Environmental allergies  -     fexofenadine-pseudoephedrine (Allegra-D Allergy & Congestion) 180-240 MG per 24 hr tablet; Take 1 tablet by mouth Daily As Needed for Allergies.  Dispense: 90  tablet; Refill: 1    If cough persists after treatment, consider CXR    Follow Up   No follow-ups on file.  Patient was given instructions and counseling regarding his condition or for health maintenance advice. Please see specific information pulled into the AVS if appropriate.

## 2025-05-28 ENCOUNTER — OFFICE VISIT (OUTPATIENT)
Dept: FAMILY MEDICINE CLINIC | Facility: CLINIC | Age: 45
End: 2025-05-28
Payer: COMMERCIAL

## 2025-05-28 VITALS
WEIGHT: 205.2 LBS | BODY MASS INDEX: 30.39 KG/M2 | HEIGHT: 69 IN | SYSTOLIC BLOOD PRESSURE: 108 MMHG | OXYGEN SATURATION: 97 % | DIASTOLIC BLOOD PRESSURE: 68 MMHG | HEART RATE: 66 BPM | TEMPERATURE: 97.5 F | RESPIRATION RATE: 18 BRPM

## 2025-05-28 DIAGNOSIS — Z71.84 ENCOUNTER FOR COUNSELING FOR TRAVEL: ICD-10-CM

## 2025-05-28 DIAGNOSIS — E78.00 ELEVATED LDL CHOLESTEROL LEVEL: ICD-10-CM

## 2025-05-28 DIAGNOSIS — Z00.00 ANNUAL PHYSICAL EXAM: Primary | ICD-10-CM

## 2025-05-28 DIAGNOSIS — Z91.09 ENVIRONMENTAL ALLERGIES: ICD-10-CM

## 2025-05-28 PROCEDURE — 99396 PREV VISIT EST AGE 40-64: CPT | Performed by: FAMILY MEDICINE

## 2025-05-28 RX ORDER — SCOPOLAMINE 1 MG/3D
1 PATCH, EXTENDED RELEASE TRANSDERMAL
Qty: 4 EACH | Refills: 0 | Status: SHIPPED | OUTPATIENT
Start: 2025-05-28

## 2025-05-28 RX ORDER — FEXOFENADINE HCL AND PSEUDOEPHEDRINE HCL 180; 240 MG/1; MG/1
1 TABLET, EXTENDED RELEASE ORAL DAILY PRN
Qty: 90 TABLET | Refills: 0 | Status: SHIPPED | OUTPATIENT
Start: 2025-05-28

## 2025-05-28 NOTE — PROGRESS NOTES
Chief Complaint  Annual Exam (Pt is fasting. )    Subjective          Santana Radford presents to [facility]  History of Present Illness  Here for annual exam     Vision exam: overdue  Dental exam: up to date     Colon cancer screening: Cologuard   No family history of colon cancer     Going on a cruise soon, requesting patches for seasickness     The following portions of the patient's history were reviewed and updated as appropriate: allergies, current medications, past family history, past medical history, past social history, past surgical history, and problem list.    Objective      Physical Exam  Vitals and nursing note reviewed.   HENT:      Right Ear: Tympanic membrane, ear canal and external ear normal.      Left Ear: Tympanic membrane, ear canal and external ear normal.   Cardiovascular:      Rate and Rhythm: Normal rate and regular rhythm.      Heart sounds: Normal heart sounds.   Pulmonary:      Effort: Pulmonary effort is normal.      Breath sounds: Normal breath sounds.   Neurological:      Mental Status: He is alert.   Psychiatric:         Mood and Affect: Mood normal.         Behavior: Behavior normal.        Result Review :                Assessment and Plan    Diagnoses and all orders for this visit:    1. Annual physical exam (Primary)  -     Comprehensive Metabolic Panel  -     Hemoglobin A1c  -     Lipid Panel With / Chol / HDL Ratio  -     CBC (No Diff)    2. Elevated LDL cholesterol level  -     Comprehensive Metabolic Panel  -     Hemoglobin A1c  -     Lipid Panel With / Chol / HDL Ratio  -     CBC (No Diff)    3. Environmental allergies  -     fexofenadine-pseudoephedrine (Allegra-D Allergy & Congestion) 180-240 MG per 24 hr tablet; Take 1 tablet by mouth Daily As Needed for Allergies.  Dispense: 90 tablet; Refill: 0    4. Encounter for counseling for travel  -     Scopolamine 1 MG/3DAYS patch; Place 1 patch on the skin as directed by provider Every 72  (Seventy-Two) Hours.  Dispense: 4 each; Refill: 0    Health advice: healthy food choices with fresh fruits and vegetables, maintain sleep pattern at least 8 hours, avoid texting and distracted driving practices; wear safety belt, engage in regular exercise, maintain healthy weight. Maintain immunizations that are up to date. Maintain health maintenance.  Follow up with PCP if struggling with depression or anxiety. Keep regular dental and eye exams. Brush and floss teeth daily.       Follow Up   Return in about 1 year (around 5/28/2026) for Annual physical.  Patient was given instructions and counseling regarding his condition or for health maintenance advice. Please see specific information pulled into the AVS if appropriate.

## 2025-05-29 LAB
ALBUMIN SERPL-MCNC: 4.3 G/DL (ref 4.1–5.1)
ALP SERPL-CCNC: 89 IU/L (ref 44–121)
ALT SERPL-CCNC: 30 IU/L (ref 0–44)
AST SERPL-CCNC: 23 IU/L (ref 0–40)
BILIRUB SERPL-MCNC: 1.2 MG/DL (ref 0–1.2)
BUN SERPL-MCNC: 12 MG/DL (ref 6–24)
BUN/CREAT SERPL: 12 (ref 9–20)
CALCIUM SERPL-MCNC: 9 MG/DL (ref 8.7–10.2)
CHLORIDE SERPL-SCNC: 103 MMOL/L (ref 96–106)
CHOLEST SERPL-MCNC: 184 MG/DL (ref 100–199)
CHOLEST/HDLC SERPL: 3.9 RATIO (ref 0–5)
CO2 SERPL-SCNC: 24 MMOL/L (ref 20–29)
CREAT SERPL-MCNC: 0.98 MG/DL (ref 0.76–1.27)
EGFRCR SERPLBLD CKD-EPI 2021: 96 ML/MIN/1.73
ERYTHROCYTE [DISTWIDTH] IN BLOOD BY AUTOMATED COUNT: 13.3 % (ref 11.6–15.4)
GLOBULIN SER CALC-MCNC: 2.1 G/DL (ref 1.5–4.5)
GLUCOSE SERPL-MCNC: 83 MG/DL (ref 70–99)
HBA1C MFR BLD: 5.3 % (ref 4.8–5.6)
HCT VFR BLD AUTO: 47.6 % (ref 37.5–51)
HDLC SERPL-MCNC: 47 MG/DL
HGB BLD-MCNC: 15.8 G/DL (ref 13–17.7)
LDLC SERPL CALC-MCNC: 122 MG/DL (ref 0–99)
MCH RBC QN AUTO: 28.8 PG (ref 26.6–33)
MCHC RBC AUTO-ENTMCNC: 33.2 G/DL (ref 31.5–35.7)
MCV RBC AUTO: 87 FL (ref 79–97)
PLATELET # BLD AUTO: 191 X10E3/UL (ref 150–450)
POTASSIUM SERPL-SCNC: 4.5 MMOL/L (ref 3.5–5.2)
PROT SERPL-MCNC: 6.4 G/DL (ref 6–8.5)
RBC # BLD AUTO: 5.49 X10E6/UL (ref 4.14–5.8)
SODIUM SERPL-SCNC: 140 MMOL/L (ref 134–144)
TRIGL SERPL-MCNC: 83 MG/DL (ref 0–149)
VLDLC SERPL CALC-MCNC: 15 MG/DL (ref 5–40)
WBC # BLD AUTO: 5.3 X10E3/UL (ref 3.4–10.8)